# Patient Record
Sex: FEMALE | Race: WHITE | NOT HISPANIC OR LATINO | Employment: OTHER
[De-identification: names, ages, dates, MRNs, and addresses within clinical notes are randomized per-mention and may not be internally consistent; named-entity substitution may affect disease eponyms.]

---

## 2017-06-03 ENCOUNTER — HEALTH MAINTENANCE LETTER (OUTPATIENT)
Age: 47
End: 2017-06-03

## 2019-11-06 ENCOUNTER — TRANSFERRED RECORDS (OUTPATIENT)
Dept: HEALTH INFORMATION MANAGEMENT | Facility: CLINIC | Age: 49
End: 2019-11-06

## 2019-11-06 LAB
C TRACH DNA SPEC QL PROBE+SIG AMP: NEGATIVE
HPV ABSTRACT: NORMAL
N GONORRHOEA DNA SPEC QL PROBE+SIG AMP: NEGATIVE
PAP-ABSTRACT: NORMAL
SPECIMEN DESCRIP: NORMAL
SPECIMEN DESCRIPTION: NORMAL

## 2019-11-26 ENCOUNTER — TRANSFERRED RECORDS (OUTPATIENT)
Dept: HEALTH INFORMATION MANAGEMENT | Facility: CLINIC | Age: 49
End: 2019-11-26

## 2020-01-02 ENCOUNTER — TRANSFERRED RECORDS (OUTPATIENT)
Dept: HEALTH INFORMATION MANAGEMENT | Facility: CLINIC | Age: 50
End: 2020-01-02

## 2020-01-06 ENCOUNTER — HOSPITAL PATHOLOGY (OUTPATIENT)
Dept: OTHER | Facility: CLINIC | Age: 50
End: 2020-01-06

## 2020-01-06 ENCOUNTER — TRANSFERRED RECORDS (OUTPATIENT)
Dept: HEALTH INFORMATION MANAGEMENT | Facility: CLINIC | Age: 50
End: 2020-01-06

## 2020-01-08 LAB — COPATH REPORT: NORMAL

## 2020-01-24 ENCOUNTER — HOSPITAL ENCOUNTER (OUTPATIENT)
Facility: CLINIC | Age: 50
Discharge: STILL ADMITTED OR ADMINISTRATIVE | End: 2020-01-24
Attending: OBSTETRICS & GYNECOLOGY | Admitting: OBSTETRICS & GYNECOLOGY
Payer: COMMERCIAL

## 2020-01-24 ENCOUNTER — ANESTHESIA (OUTPATIENT)
Dept: SURGERY | Facility: CLINIC | Age: 50
End: 2020-01-24
Payer: COMMERCIAL

## 2020-01-24 ENCOUNTER — ANESTHESIA EVENT (OUTPATIENT)
Dept: SURGERY | Facility: CLINIC | Age: 50
End: 2020-01-24
Payer: COMMERCIAL

## 2020-01-24 ENCOUNTER — HOSPITAL ENCOUNTER (OUTPATIENT)
Facility: CLINIC | Age: 50
Setting detail: OBSERVATION
Discharge: STILL ADMITTED OR ADMINISTRATIVE | End: 2020-01-25
Attending: OBSTETRICS & GYNECOLOGY | Admitting: OBSTETRICS & GYNECOLOGY
Payer: COMMERCIAL

## 2020-01-24 VITALS
OXYGEN SATURATION: 100 % | DIASTOLIC BLOOD PRESSURE: 89 MMHG | SYSTOLIC BLOOD PRESSURE: 146 MMHG | HEART RATE: 84 BPM | RESPIRATION RATE: 16 BRPM | WEIGHT: 150 LBS | HEIGHT: 59 IN | TEMPERATURE: 97.8 F | BODY MASS INDEX: 30.24 KG/M2

## 2020-01-24 DIAGNOSIS — N99.820 POSTOPERATIVE VAGINAL BLEEDING FOLLOWING GENITOURINARY PROCEDURE: Primary | ICD-10-CM

## 2020-01-24 PROBLEM — G89.18 POST-OPERATIVE PAIN: Status: ACTIVE | Noted: 2020-01-24

## 2020-01-24 LAB
ABO + RH BLD: NORMAL
ABO + RH BLD: NORMAL
BLD GP AB SCN SERPL QL: NORMAL
BLOOD BANK CMNT PATIENT-IMP: NORMAL
CREAT SERPL-MCNC: 0.8 MG/DL (ref 0.52–1.04)
GFR SERPL CREATININE-BSD FRML MDRD: 84 ML/MIN/{1.73_M2}
HGB BLD-MCNC: 12.8 G/DL (ref 11.7–15.7)
HGB BLD-MCNC: 15.3 G/DL (ref 11.7–15.7)
SPECIMEN EXP DATE BLD: NORMAL

## 2020-01-24 PROCEDURE — 71000013 ZZH RECOVERY PHASE 1 LEVEL 1 EA ADDTL HR: Performed by: OBSTETRICS & GYNECOLOGY

## 2020-01-24 PROCEDURE — 36000050 ZZH SURGERY LEVEL 2 1ST 30 MIN: Performed by: OBSTETRICS & GYNECOLOGY

## 2020-01-24 PROCEDURE — 25800030 ZZH RX IP 258 OP 636: Performed by: ANESTHESIOLOGY

## 2020-01-24 PROCEDURE — 40000169 ZZH STATISTIC PRE-PROCEDURE ASSESSMENT I: Performed by: OBSTETRICS & GYNECOLOGY

## 2020-01-24 PROCEDURE — 36000052 ZZH SURGERY LEVEL 2 EA 15 ADDTL MIN: Performed by: OBSTETRICS & GYNECOLOGY

## 2020-01-24 PROCEDURE — 36415 COLL VENOUS BLD VENIPUNCTURE: CPT | Performed by: OBSTETRICS & GYNECOLOGY

## 2020-01-24 PROCEDURE — 85018 HEMOGLOBIN: CPT | Performed by: OBSTETRICS & GYNECOLOGY

## 2020-01-24 PROCEDURE — 86901 BLOOD TYPING SEROLOGIC RH(D): CPT | Performed by: OBSTETRICS & GYNECOLOGY

## 2020-01-24 PROCEDURE — G0378 HOSPITAL OBSERVATION PER HR: HCPCS

## 2020-01-24 PROCEDURE — 82565 ASSAY OF CREATININE: CPT | Performed by: OBSTETRICS & GYNECOLOGY

## 2020-01-24 PROCEDURE — 71000012 ZZH RECOVERY PHASE 1 LEVEL 1 FIRST HR: Performed by: OBSTETRICS & GYNECOLOGY

## 2020-01-24 PROCEDURE — 37000008 ZZH ANESTHESIA TECHNICAL FEE, 1ST 30 MIN: Performed by: OBSTETRICS & GYNECOLOGY

## 2020-01-24 PROCEDURE — 25800030 ZZH RX IP 258 OP 636: Performed by: NURSE ANESTHETIST, CERTIFIED REGISTERED

## 2020-01-24 PROCEDURE — 86900 BLOOD TYPING SEROLOGIC ABO: CPT | Performed by: OBSTETRICS & GYNECOLOGY

## 2020-01-24 PROCEDURE — 25000128 H RX IP 250 OP 636: Performed by: ANESTHESIOLOGY

## 2020-01-24 PROCEDURE — 25000128 H RX IP 250 OP 636: Performed by: OBSTETRICS & GYNECOLOGY

## 2020-01-24 PROCEDURE — 27210794 ZZH OR GENERAL SUPPLY STERILE: Performed by: OBSTETRICS & GYNECOLOGY

## 2020-01-24 PROCEDURE — 86850 RBC ANTIBODY SCREEN: CPT | Performed by: OBSTETRICS & GYNECOLOGY

## 2020-01-24 PROCEDURE — 25000125 ZZHC RX 250: Performed by: ANESTHESIOLOGY

## 2020-01-24 PROCEDURE — 37000009 ZZH ANESTHESIA TECHNICAL FEE, EACH ADDTL 15 MIN: Performed by: OBSTETRICS & GYNECOLOGY

## 2020-01-24 PROCEDURE — 25000125 ZZHC RX 250: Performed by: NURSE ANESTHETIST, CERTIFIED REGISTERED

## 2020-01-24 PROCEDURE — 25000128 H RX IP 250 OP 636: Performed by: NURSE ANESTHETIST, CERTIFIED REGISTERED

## 2020-01-24 RX ORDER — ONDANSETRON 2 MG/ML
4 INJECTION INTRAMUSCULAR; INTRAVENOUS EVERY 6 HOURS PRN
Status: DISCONTINUED | OUTPATIENT
Start: 2020-01-24 | End: 2020-01-25 | Stop reason: HOSPADM

## 2020-01-24 RX ORDER — CEFAZOLIN SODIUM 2 G/100ML
2 INJECTION, SOLUTION INTRAVENOUS
Status: DISCONTINUED | OUTPATIENT
Start: 2020-01-24 | End: 2020-01-24 | Stop reason: HOSPADM

## 2020-01-24 RX ORDER — SCOLOPAMINE TRANSDERMAL SYSTEM 1 MG/1
1 PATCH, EXTENDED RELEASE TRANSDERMAL ONCE
Status: DISCONTINUED | OUTPATIENT
Start: 2020-01-24 | End: 2020-01-24 | Stop reason: HOSPADM

## 2020-01-24 RX ORDER — CEFAZOLIN SODIUM 1 G/3ML
1 INJECTION, POWDER, FOR SOLUTION INTRAMUSCULAR; INTRAVENOUS SEE ADMIN INSTRUCTIONS
Status: DISCONTINUED | OUTPATIENT
Start: 2020-01-24 | End: 2020-01-24 | Stop reason: HOSPADM

## 2020-01-24 RX ORDER — NALOXONE HYDROCHLORIDE 0.4 MG/ML
.1-.4 INJECTION, SOLUTION INTRAMUSCULAR; INTRAVENOUS; SUBCUTANEOUS
Status: DISCONTINUED | OUTPATIENT
Start: 2020-01-24 | End: 2020-01-25 | Stop reason: HOSPADM

## 2020-01-24 RX ORDER — SODIUM CHLORIDE, SODIUM LACTATE, POTASSIUM CHLORIDE, CALCIUM CHLORIDE 600; 310; 30; 20 MG/100ML; MG/100ML; MG/100ML; MG/100ML
INJECTION, SOLUTION INTRAVENOUS CONTINUOUS
Status: DISCONTINUED | OUTPATIENT
Start: 2020-01-24 | End: 2020-01-24 | Stop reason: HOSPADM

## 2020-01-24 RX ORDER — HYDROMORPHONE HYDROCHLORIDE 1 MG/ML
.3-.5 INJECTION, SOLUTION INTRAMUSCULAR; INTRAVENOUS; SUBCUTANEOUS EVERY 5 MIN PRN
Status: DISCONTINUED | OUTPATIENT
Start: 2020-01-24 | End: 2020-01-24 | Stop reason: HOSPADM

## 2020-01-24 RX ORDER — FENTANYL CITRATE 50 UG/ML
50 INJECTION, SOLUTION INTRAMUSCULAR; INTRAVENOUS EVERY 5 MIN PRN
Status: DISCONTINUED | OUTPATIENT
Start: 2020-01-24 | End: 2020-01-24 | Stop reason: HOSPADM

## 2020-01-24 RX ORDER — ONDANSETRON 4 MG/1
4 TABLET, ORALLY DISINTEGRATING ORAL EVERY 30 MIN PRN
Status: CANCELLED | OUTPATIENT
Start: 2020-01-24

## 2020-01-24 RX ORDER — NALOXONE HYDROCHLORIDE 0.4 MG/ML
.1-.4 INJECTION, SOLUTION INTRAMUSCULAR; INTRAVENOUS; SUBCUTANEOUS
Status: CANCELLED | OUTPATIENT
Start: 2020-01-24 | End: 2020-01-25

## 2020-01-24 RX ORDER — FENTANYL CITRATE 50 UG/ML
25-50 INJECTION, SOLUTION INTRAMUSCULAR; INTRAVENOUS
Status: DISCONTINUED | OUTPATIENT
Start: 2020-01-24 | End: 2020-01-24 | Stop reason: HOSPADM

## 2020-01-24 RX ORDER — PROPOFOL 10 MG/ML
INJECTION, EMULSION INTRAVENOUS CONTINUOUS PRN
Status: DISCONTINUED | OUTPATIENT
Start: 2020-01-24 | End: 2020-01-24

## 2020-01-24 RX ORDER — HYDROMORPHONE HYDROCHLORIDE 1 MG/ML
.3-.5 INJECTION, SOLUTION INTRAMUSCULAR; INTRAVENOUS; SUBCUTANEOUS EVERY 5 MIN PRN
Status: CANCELLED | OUTPATIENT
Start: 2020-01-24

## 2020-01-24 RX ORDER — ONDANSETRON 4 MG/1
4 TABLET, ORALLY DISINTEGRATING ORAL EVERY 30 MIN PRN
Status: DISCONTINUED | OUTPATIENT
Start: 2020-01-24 | End: 2020-01-24 | Stop reason: HOSPADM

## 2020-01-24 RX ORDER — FENTANYL CITRATE 50 UG/ML
25-50 INJECTION, SOLUTION INTRAMUSCULAR; INTRAVENOUS
Status: CANCELLED | OUTPATIENT
Start: 2020-01-24

## 2020-01-24 RX ORDER — CEFAZOLIN SODIUM 1 G/3ML
1 INJECTION, POWDER, FOR SOLUTION INTRAMUSCULAR; INTRAVENOUS SEE ADMIN INSTRUCTIONS
Status: CANCELLED | OUTPATIENT
Start: 2020-01-24

## 2020-01-24 RX ORDER — CEFAZOLIN SODIUM 1 G/3ML
INJECTION, POWDER, FOR SOLUTION INTRAMUSCULAR; INTRAVENOUS PRN
Status: DISCONTINUED | OUTPATIENT
Start: 2020-01-24 | End: 2020-01-24

## 2020-01-24 RX ORDER — ONDANSETRON 2 MG/ML
4 INJECTION INTRAMUSCULAR; INTRAVENOUS EVERY 30 MIN PRN
Status: DISCONTINUED | OUTPATIENT
Start: 2020-01-24 | End: 2020-01-24 | Stop reason: HOSPADM

## 2020-01-24 RX ORDER — LIDOCAINE HYDROCHLORIDE 20 MG/ML
INJECTION, SOLUTION INFILTRATION; PERINEURAL PRN
Status: DISCONTINUED | OUTPATIENT
Start: 2020-01-24 | End: 2020-01-24

## 2020-01-24 RX ORDER — HYDROMORPHONE HYDROCHLORIDE 1 MG/ML
0.2 INJECTION, SOLUTION INTRAMUSCULAR; INTRAVENOUS; SUBCUTANEOUS
Status: DISCONTINUED | OUTPATIENT
Start: 2020-01-24 | End: 2020-01-25 | Stop reason: HOSPADM

## 2020-01-24 RX ORDER — SODIUM CHLORIDE, SODIUM LACTATE, POTASSIUM CHLORIDE, CALCIUM CHLORIDE 600; 310; 30; 20 MG/100ML; MG/100ML; MG/100ML; MG/100ML
INJECTION, SOLUTION INTRAVENOUS CONTINUOUS
Status: CANCELLED | OUTPATIENT
Start: 2020-01-24

## 2020-01-24 RX ORDER — ONDANSETRON 2 MG/ML
4 INJECTION INTRAMUSCULAR; INTRAVENOUS EVERY 30 MIN PRN
Status: CANCELLED | OUTPATIENT
Start: 2020-01-24

## 2020-01-24 RX ORDER — PROPOFOL 10 MG/ML
INJECTION, EMULSION INTRAVENOUS PRN
Status: DISCONTINUED | OUTPATIENT
Start: 2020-01-24 | End: 2020-01-24

## 2020-01-24 RX ORDER — CEFAZOLIN SODIUM 2 G/100ML
2 INJECTION, SOLUTION INTRAVENOUS
Status: CANCELLED | OUTPATIENT
Start: 2020-01-24

## 2020-01-24 RX ORDER — KETOROLAC TROMETHAMINE 30 MG/ML
30 INJECTION, SOLUTION INTRAMUSCULAR; INTRAVENOUS EVERY 6 HOURS PRN
Status: DISCONTINUED | OUTPATIENT
Start: 2020-01-24 | End: 2020-01-25 | Stop reason: HOSPADM

## 2020-01-24 RX ORDER — IBUPROFEN 200 MG
600 TABLET ORAL 2 TIMES DAILY PRN
COMMUNITY
End: 2024-02-16

## 2020-01-24 RX ORDER — ONDANSETRON 2 MG/ML
INJECTION INTRAMUSCULAR; INTRAVENOUS PRN
Status: DISCONTINUED | OUTPATIENT
Start: 2020-01-24 | End: 2020-01-24

## 2020-01-24 RX ORDER — FENTANYL CITRATE 50 UG/ML
INJECTION, SOLUTION INTRAMUSCULAR; INTRAVENOUS PRN
Status: DISCONTINUED | OUTPATIENT
Start: 2020-01-24 | End: 2020-01-24

## 2020-01-24 RX ORDER — SODIUM CHLORIDE, SODIUM LACTATE, POTASSIUM CHLORIDE, CALCIUM CHLORIDE 600; 310; 30; 20 MG/100ML; MG/100ML; MG/100ML; MG/100ML
INJECTION, SOLUTION INTRAVENOUS CONTINUOUS PRN
Status: DISCONTINUED | OUTPATIENT
Start: 2020-01-24 | End: 2020-01-24

## 2020-01-24 RX ORDER — DEXAMETHASONE SODIUM PHOSPHATE 4 MG/ML
INJECTION, SOLUTION INTRA-ARTICULAR; INTRALESIONAL; INTRAMUSCULAR; INTRAVENOUS; SOFT TISSUE PRN
Status: DISCONTINUED | OUTPATIENT
Start: 2020-01-24 | End: 2020-01-24

## 2020-01-24 RX ADMIN — FENTANYL CITRATE 50 MCG: 50 INJECTION, SOLUTION INTRAMUSCULAR; INTRAVENOUS at 15:21

## 2020-01-24 RX ADMIN — LIDOCAINE HYDROCHLORIDE 100 MG: 20 INJECTION, SOLUTION INFILTRATION; PERINEURAL at 14:04

## 2020-01-24 RX ADMIN — HYDROMORPHONE HYDROCHLORIDE 0.5 MG: 1 INJECTION, SOLUTION INTRAMUSCULAR; INTRAVENOUS; SUBCUTANEOUS at 16:01

## 2020-01-24 RX ADMIN — SODIUM CHLORIDE, POTASSIUM CHLORIDE, SODIUM LACTATE AND CALCIUM CHLORIDE: 600; 310; 30; 20 INJECTION, SOLUTION INTRAVENOUS at 15:55

## 2020-01-24 RX ADMIN — CEFAZOLIN 2 G: 1 INJECTION, POWDER, FOR SOLUTION INTRAMUSCULAR; INTRAVENOUS at 14:10

## 2020-01-24 RX ADMIN — HYDROMORPHONE HYDROCHLORIDE 0.2 MG: 1 INJECTION, SOLUTION INTRAMUSCULAR; INTRAVENOUS; SUBCUTANEOUS at 20:49

## 2020-01-24 RX ADMIN — SODIUM CHLORIDE, POTASSIUM CHLORIDE, SODIUM LACTATE AND CALCIUM CHLORIDE: 600; 310; 30; 20 INJECTION, SOLUTION INTRAVENOUS at 13:32

## 2020-01-24 RX ADMIN — SUCCINYLCHOLINE CHLORIDE 100 MG: 20 INJECTION, SOLUTION INTRAMUSCULAR; INTRAVENOUS; PARENTERAL at 14:05

## 2020-01-24 RX ADMIN — SODIUM CHLORIDE, POTASSIUM CHLORIDE, SODIUM LACTATE AND CALCIUM CHLORIDE: 600; 310; 30; 20 INJECTION, SOLUTION INTRAVENOUS at 13:58

## 2020-01-24 RX ADMIN — ONDANSETRON 4 MG: 2 INJECTION INTRAMUSCULAR; INTRAVENOUS at 14:00

## 2020-01-24 RX ADMIN — MIDAZOLAM 2 MG: 1 INJECTION INTRAMUSCULAR; INTRAVENOUS at 14:00

## 2020-01-24 RX ADMIN — DEXAMETHASONE SODIUM PHOSPHATE 4 MG: 4 INJECTION, SOLUTION INTRA-ARTICULAR; INTRALESIONAL; INTRAMUSCULAR; INTRAVENOUS; SOFT TISSUE at 14:04

## 2020-01-24 RX ADMIN — FENTANYL CITRATE 100 MCG: 50 INJECTION, SOLUTION INTRAMUSCULAR; INTRAVENOUS at 14:04

## 2020-01-24 RX ADMIN — FENTANYL CITRATE 50 MCG: 50 INJECTION, SOLUTION INTRAMUSCULAR; INTRAVENOUS at 15:04

## 2020-01-24 RX ADMIN — SCOLOPAMINE TRANSDERMAL SYSTEM 1 PATCH: 1 PATCH, EXTENDED RELEASE TRANSDERMAL at 13:39

## 2020-01-24 RX ADMIN — PROPOFOL 175 MCG/KG/MIN: 10 INJECTION, EMULSION INTRAVENOUS at 14:04

## 2020-01-24 RX ADMIN — PROPOFOL 200 MG: 10 INJECTION, EMULSION INTRAVENOUS at 14:04

## 2020-01-24 ASSESSMENT — MIFFLIN-ST. JEOR
SCORE: 1201.03
SCORE: 1201.03

## 2020-01-24 ASSESSMENT — LIFESTYLE VARIABLES
TOBACCO_USE: 0
TOBACCO_USE: 0

## 2020-01-24 NOTE — ANESTHESIA CARE TRANSFER NOTE
Patient: Radha Bonilla    Procedure(s):  EXAM UNDER ANESTHESIA  REPAIR OF VAGINAL CUFF    Diagnosis: Vaginal bleeding [N93.9]  Diagnosis Additional Information: No value filed.    Anesthesia Type:   No value filed.     Note:  Airway :Face Mask  Patient transferred to:PACU  Handoff Report: Identifed the Patient, Identified the Reponsible Provider, Reviewed the pertinent medical history, Discussed the surgical course, Reviewed Intra-OP anesthesia mangement and issues during anesthesia, Set expectations for post-procedure period and Allowed opportunity for questions and acknowledgement of understanding      Vitals: (Last set prior to Anesthesia Care Transfer)    CRNA VITALS  1/24/2020 1423 - 1/24/2020 1459      1/24/2020             Pulse:  101    SpO2:  98 %    Resp Rate (set):  10                Electronically Signed By: JOEL Wilson CRNA  January 24, 2020  2:59 PM

## 2020-01-24 NOTE — H&P
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 Attempt at controlling bleeding with Monsel's solution and vaginal packing made. Due to ongoing active bleeding, plans was made for exam under anesthesia.    PMH Ill None   Surg LSH, trachelectomy/apical colpopexy/enterocele repair   Meds None   All NKDA    SHx  Non smoker     PE Well appearing   AFVSS   Abdomen soft   Cardio RRR   Resp CTA   Bright red bleeding from vaginal cuff    Plan exam under anesthesia with suture repair of vaginal cuff defect

## 2020-01-24 NOTE — BRIEF OP NOTE
[facility] Brief Operative Note    Pre-operative diagnosis: Vaginal bleeding [N93.9]   Post-operative diagnosis Vaginal cuff dehiscence after recent trachelectomy   Procedure: Procedure(s):  EXAM UNDER ANESTHESIA  REPAIR OF VAGINAL CUFF   Surgeon(s): Surgeon(s) and Role:     * Haritha Metz MD - Primary   Estimated blood loss: * No values recorded between 1/24/2020  2:16 PM and 1/24/2020  2:49 PM *    Specimens: * No specimens in log *   Findings: Vaginal cuff mucosal dehiscence

## 2020-01-24 NOTE — ANESTHESIA PREPROCEDURE EVALUATION
Anesthesia Pre-Procedure Evaluation    Patient: Rubina Bonilla   MRN: [id number] : 1970          Preoperative Diagnosis: Vaginal bleeding [N93.9]    Procedure(s):  EXAM UNDER ANESTHESIA FOR VAGINAL BLEEDING.    No past medical history on file.  No past surgical history on file.    Anesthesia Evaluation     .             ROS/MED HX    ENT/Pulmonary:      (-) tobacco use and sleep apnea   Neurologic:  - neg neurologic ROS     Cardiovascular:  - neg cardiovascular ROS       METS/Exercise Tolerance:     Hematologic: Comments: Vaginal bleeding s/p hysterectomy        Musculoskeletal:         GI/Hepatic:        (-) GERD   Renal/Genitourinary:  - ROS Renal section negative       Endo:  - neg endo ROS       Psychiatric: Comment: 1-2 beers per day        Infectious Disease:         Malignancy:         Other:                          Physical Exam  Normal systems: cardiovascular, pulmonary and dental    Airway   Mallampati: II  TM distance: >3 FB  Neck ROM: full    Dental     Cardiovascular       Pulmonary             No results found for: WBC, HGB, HCT, PLT, CRP, SED, NA, POTASSIUM, CHLORIDE, CO2, BUN, CR, GLC, JHONNY, PHOS, MAG, ALBUMIN, PROTTOTAL, ALT, AST, GGT, ALKPHOS, BILITOTAL, BILIDIRECT, LIPASE, AMYLASE, DAVID, PTT, INR, FIBR, TSH, T4, T3, HCG, HCGS, CKTOTAL, CKMB, TROPN    Preop Vitals  BP Readings from Last 3 Encounters:   No data found for BP    Pulse Readings from Last 3 Encounters:   No data found for Pulse      Resp Readings from Last 3 Encounters:   No data found for Resp    SpO2 Readings from Last 3 Encounters:   No data found for SpO2      Temp Readings from Last 1 Encounters:   No data found for Temp    Ht Readings from Last 1 Encounters:   No data found for Ht      Wt Readings from Last 1 Encounters:   No data found for Wt    There is no height or weight on file to calculate BMI.       Anesthesia Plan      History & Physical Review  History and physical reviewed and following examination; no  interval change.    ASA Status:  2 emergent.    NPO Status:  Waived due to emergency    Plan for General and RSI with Intravenous and Propofol induction. Maintenance will be TIVA.    PONV prophylaxis:  Ondansetron (or other 5HT-3), Dexamethasone or Solumedrol and Scopolamine patch       Postoperative Care  Postoperative pain management:  IV analgesics and Oral pain medications.      Consents  Anesthetic plan, risks, benefits and alternatives discussed with:  Patient..                 Steven Mitchell MD

## 2020-01-25 VITALS
WEIGHT: 150 LBS | DIASTOLIC BLOOD PRESSURE: 58 MMHG | HEIGHT: 59 IN | HEART RATE: 73 BPM | BODY MASS INDEX: 30.24 KG/M2 | SYSTOLIC BLOOD PRESSURE: 103 MMHG | OXYGEN SATURATION: 92 % | TEMPERATURE: 98 F | RESPIRATION RATE: 16 BRPM

## 2020-01-25 LAB — HGB BLD-MCNC: 12.4 G/DL (ref 11.7–15.7)

## 2020-01-25 PROCEDURE — G0378 HOSPITAL OBSERVATION PER HR: HCPCS

## 2020-01-25 PROCEDURE — 85018 HEMOGLOBIN: CPT | Performed by: OBSTETRICS & GYNECOLOGY

## 2020-01-25 PROCEDURE — 25000128 H RX IP 250 OP 636: Performed by: OBSTETRICS & GYNECOLOGY

## 2020-01-25 PROCEDURE — 36415 COLL VENOUS BLD VENIPUNCTURE: CPT | Performed by: OBSTETRICS & GYNECOLOGY

## 2020-01-25 RX ADMIN — KETOROLAC TROMETHAMINE 30 MG: 30 INJECTION, SOLUTION INTRAMUSCULAR at 08:48

## 2020-01-25 NOTE — PROGRESS NOTES
"Patient slept well, voiding normally. Moderate cramping, treated with Toradol. No active bleeding.     /58   Pulse 73   Temp 98  F (36.7  C) (Oral)   Resp 16   Ht 1.499 m (4' 11\")   Wt 68 kg (150 lb)   LMP 09/07/2013   SpO2 92%   BMI 30.30 kg/m    Gen--NAD  CV--RRR  Abd--soft, ND, NTTP  Pelvic--Pad with minimal spotting. Vaginal packing removed, all old blood and clot. No active bleeding with monitoring for >5 minutes.     Recent Labs   Lab 01/25/20  0848 01/24/20  1931 01/24/20  1325   HGB 12.4 12.8 15.3     A/P:  Vitals and hemoglobin very stable, no active bleeding on exam or throughout evening. Strict bedrest at home today and minimal activity through the week. Will have triage call patient with follow-up appt this week.     Tiffanie Kwon MD  "

## 2020-01-25 NOTE — OP NOTE
Procedure Date: 01/24/2020      PROCEDURE:  Exam under anesthesia, repair of vaginal cuff.      PREOPERATIVE DIAGNOSES:  Vaginal bleeding after recent trachelectomy with apical colpopexy and enterocele repair.      POSTOPERATIVE DIAGNOSES:  Evidence of separation of the vaginal cuff.      SURGEON:  Kanwal Metz MD      ANESTHESIA:  General.      ESTIMATED BLOOD LOSS:  5 mL.      FINDINGS:  Evidence of separation of the vaginal mucosa at the level of the vaginal cuff.      COMPLICATIONS:  None.      INDICATIONS:  The patient is an otherwise healthy 49-year-old who in 2013 underwent a laparoscopic supracervical hysterectomy.  Two weeks ago, she underwent a laparoscopic trachelectomy with enterocele repair and apical colpopexy.  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  Inspection in the office showed vaginal bleeding with an unidentified source.  She was treated with pressure and vaginal packing with no relief.  She was brought to the operating room for that reason.      DESCRIPTION OF PROCEDURE:  The patient was taken to the operating room where general anesthesia was induced.  She was placed in high lithotomy position and prepped and draped in the usual sterile fashion.  IV Ancef was administered.  Pneumatic compression devices were applied.  A Huertas catheter was anchored.      The vaginal cuff was visualized.  There was mucosal separation, but there was no evidence of dehiscence of intra-abdominal contents.  An Allis clamp was used to jesica the vaginal cuff.  The bulk of the bleeding appeared to be coming from the left apex.  Approximately 6 stitches of 0 Vicryl suture were placed along the cuff.  Hemostasis was confirmed with suction and sponge and inspection.  At the completion of the procedure, instruments were removed and the patient was extubated and taken to the recovery room in stable condition.         KANWAL METZ MD             D:  2020   T: 2020   MT: EMILY      Name:     ADWOA COBB   MRN:      [id number]        Account:        [id number]   :      1970           Procedure Date: 2020      Document: [id number]

## 2020-01-25 NOTE — PHARMACY-ADMISSION MEDICATION HISTORY
"Pharmacy Medication History  Admission medication history interview status for the 1/24/2020  admission is complete. See EPIC admission navigator for prior to admission medications     Medication history sources: Patient  Medication history source reliability: Good  Adherence assessment: Good    Significant changes made to the medication list:  Added all OTC meds      Additional medication history information:   none    Medication reconciliation completed by provider prior to medication history? No    Time spent in this activity: 10\"      Prior to Admission medications    Medication Sig Last Dose Taking? Auth Provider   ibuprofen (ADVIL/MOTRIN) 200 MG tablet Take 600 mg by mouth 2 times daily as needed for mild pain 1/24/2020 at 0830 Yes Unknown, Entered By History   MAGNESIUM PO Take 1 tablet by mouth every morning strength unknown Past Week at Unknown time Yes Unknown, Entered By History   Omega-3 Fatty Acids (FISH OIL OMEGA-3 PO) Take 1 capsule by mouth every morning strength unknown 1/24/2020 at am Yes Unknown, Entered By History       "

## 2020-01-25 NOTE — ANESTHESIA PREPROCEDURE EVALUATION
"Anesthesia Pre-Procedure Evaluation    Patient: Radha Bonilla   MRN: [id number] : 1970          Preoperative Diagnosis: Vaginal bleeding [N93.9]    Procedure(s):  EXAM UNDER ANESTHESIA  REPAIR OF VAGINAL CUFF    Past Medical History:   Diagnosis Date     Gastro-oesophageal reflux disease      Menorrhagia      Past Surgical History:   Procedure Laterality Date     DILATION AND CURETTAGE       LAPAROSCOPIC HYSTERECTOMY SUPRACERVICAL  2013    Procedure: LAPAROSCOPIC HYSTERECTOMY SUPRACERVICAL;  LAPAROSCOPIC SUPRACERVICAL HYSTERECTOMY,BILATERAL SALPINGECTOMY;  Surgeon: Olivier Plasencia MD;  Location:  OR       Anesthesia Evaluation     .             ROS/MED HX    ENT/Pulmonary:      (-) tobacco use and sleep apnea   Neurologic:       Cardiovascular:         METS/Exercise Tolerance:     Hematologic: Comments: Vaginal bleeding s/p hysterectomy        Musculoskeletal:         GI/Hepatic:     (+) GERD Asymptomatic on medication,       Renal/Genitourinary:         Endo:         Psychiatric: Comment: 1-2 beers per day        Infectious Disease:         Malignancy:         Other:                          Physical Exam  Normal systems: cardiovascular, pulmonary and dental    Airway   Mallampati: I  TM distance: >3 FB  Neck ROM: full    Dental     Cardiovascular       Pulmonary             Lab Results   Component Value Date    HGB 15.3 2020    TSH 1.30 2014    HCGS Negative 2013       Preop Vitals  BP Readings from Last 3 Encounters:   20 116/68   14 108/66   14 104/70    Pulse Readings from Last 3 Encounters:   20 80   14 88   14 72      Resp Readings from Last 3 Encounters:   20 11   14 20   13 16    SpO2 Readings from Last 3 Encounters:   20 97%   14 98%   14 96%      Temp Readings from Last 1 Encounters:   20 37.4  C (99.3  F) (Temporal)    Ht Readings from Last 1 Encounters:   14 1.499 m (4' 11\") " "     Wt Readings from Last 1 Encounters:   12/04/14 72.1 kg (159 lb)    Estimated body mass index is 32.11 kg/m  as calculated from the following:    Height as of 7/8/14: 1.499 m (4' 11\").    Weight as of 12/4/14: 72.1 kg (159 lb).       Anesthesia Plan      History & Physical Review  History and physical reviewed and following examination; no interval change.    ASA Status:  2 emergent.    NPO Status:  Waived due to emergency    Plan for General, ETT and RSI with Intravenous and Propofol induction. Maintenance will be Inhalation.    PONV prophylaxis:  Ondansetron (or other 5HT-3) and Dexamethasone or Solumedrol       Postoperative Care  Postoperative pain management:  IV analgesics and Oral pain medications.      Consents  Anesthetic plan, risks, benefits and alternatives discussed with:  Patient..                 Steven Mitchell MD  "

## 2020-01-25 NOTE — PLAN OF CARE
Minimal vaginal drainage overnight. Medicated x1 on evening shift with IVP Dilaudid. Otherwise just c/o pressure from vaginal packing. No further pain meds given. Up to BS with assist of 1 x2. Hoping for discharge after MD see her this am.

## 2020-01-25 NOTE — PLAN OF CARE
Pt arrived via cart, VSS, afebrile. Pt states pain at a 4 on 0/10 pain scale, describes more pressure in the vaginal area than pain. Pt used bedpan with 500 ml of juancarlos red blood/urine present. Packing and half of pad bloody. Packing in place, new pad applied. Pt NPO at this time. Hgb recheck at 1930,  Dr. Peck will be on call for patient needs this weekend. Will continue to monitor. Family at bedside.

## 2020-01-25 NOTE — DISCHARGE INSTRUCTIONS
Discharge Instructions following Vaginal or Abdominal Hysterectomy  [facility]    Please return to the clinic in:       1 weeks;         2 weeks;          4weeks   Office will to call you Monday to make this appointment.  Diet:    You may feel less hungry at first.  Try to eat a well balanced diet with lots of proteins, fruits, vegetables, and whole grains.  Avoid spicy and greasy foods.    Drink plenty of fluids - at least 8 tall glasses a day.  Water is best.  Try to limit caffeine (found in coffee, tea, and cola drinks).  This helps prevent constipation (hard stools that are difficult to pass).    If constipation is a problem, consider one of these medicines: docusate (Colace), docusate with casanthranol (Yaquelin-Colace), psyllium (Metamucil) or milk of magnesia.  You can buy these at the drug store.  Follow the instructions listed on the label.  Activity:    You will need plenty of rest.  Please keep activity at a minimum for at least 1 week.  It is ok to climb stairs, but use the handrail in case you get dizzy.    Do not drive while using strong pain medications (narcotics).  After that, do not drive until you can stomp on the brakes without pain or flinching.      Do not use tampons, douche, or have sex (intercourse) until you see your doctor.    Don t lift or push anything over 15 lbs until your doctor says it s safe.  Avoid heavy or strenuous exercise.    Listen to your body.  If you feel tired or have backaches, you may be doing too much too quickly.  Pain control:    You pain should improve over the next 2-3 weeks.  If you have increased pain, please call the clinic.  It is normal to see a little sore after mild exercise.    Always take your pain medicine as directed by your doctor.  Drink a full glass of water with each dose.  Bathing    Use care to avoid slips and falls.  Gently pat your incisions dry after bathing.    After abdominal hysterectomy (through the belly):  you may shower.  Avoid tub baths and swimming for two weeks, or until your cuts heal.    After vaginal surgery you may bathe or shower as usual.  If you had surgery to repair the vaginal wall, it may help to soak in a warm bath for 20 minutes twice daily.  This will speed healing and reduce tenderness.  Normal Symptoms:    You may notice a small amount of bleeding or fluid coming from your vagina.  This could last for several weeks.  Wear pads as needed.    You may see stitches poking out of the vagina.  You may also see stitches pass through the vagina (they will look like tiny threads).  Both of these are normal.  Most stitches will dissolve within three months.    The area around your stitches may feel numb.  This should go away in less than a year.    After surgery, it is common to feel dizzy or lightheaded at times.  You might also have hot flashes, trouble sleeping, sudden mood swings and irritability.  If any of these symptoms become a problem, please call your doctor s office.  If you had a vaginal repair, you may feel tugging or pulling in the vagina.  This is a normal part of healing.    Notify your surgeon for the following signs and symptoms:    Severe chills and temperature of 100.4 oF or greater, taken under the tongue.    Bright red blood or large clots coming out of the vagina - enough to soak one pad (sanitary napkin) per hour.    Increased pain, warmth, swelling, redness at surgery site.    Foul smelling, green/yellow discharge from incision.    Urine or vaginal fluid that smells bad.    You cannot urinate (use the toilet), it burns when you urinate, or you need to go more often.    Severe nausea (feeling sick to your stomach) or vomiting (throwing up).    Increased pain that you cannot control with medicine.    Yann pain and swelling in one or both legs.    Any questions or concerns.    Same Day Surgery Discharge Instructions for  Sedation and General Anesthesia       It's not unusual to feel  dizzy, light-headed or faint for up to 24 hours after surgery or while taking pain medication.  If you have these symptoms: sit for a few minutes before standing and have someone assist you when you get up to walk or use the bathroom.      You should rest and relax for the next 24 hours. We recommend you make arrangements to have an adult stay with you for at least 24 hours after your discharge.  Avoid hazardous and strenuous activity.      DO NOT DRIVE any vehicle or operate mechanical equipment for 24 hours following the end of your surgery.  Even though you may feel normal, your reactions may be affected by the medication you have received.      Do not drink alcoholic beverages for 24 hours following surgery.       Slowly progress to your regular diet as you feel able. It's not unusual to feel nauseated and/or vomit after receiving anesthesia.  If you develop these symptoms, drink clear liquids (apple juice, ginger ale, broth, 7-up, etc. ) until you feel better.  If your nausea and vomiting persists for 24 hours, please notify your surgeon.        All narcotic pain medications, along with inactivity and anesthesia, can cause constipation. Drinking plenty of liquids and increasing fiber intake will help.      For any questions of a medical nature, call your surgeon.      Do not make important decisions for 24 hours.      If you had general anesthesia, you may have a sore throat for a couple of days related to the breathing tube used during surgery.  You may use Cepacol lozenges to help with this discomfort.  If it worsens or if you develop a fever, contact your surgeon.       If you feel your pain is not well managed with the pain medications prescribed by your surgeon, please contact your surgeon's office to let them know so they can address your concerns.             **If you have questions or concerns about your procedure,   Call Dr. Haritha Metz at [phone]**    Dr. Doyle MD is on-call this  weekend.

## 2020-01-25 NOTE — ANESTHESIA POSTPROCEDURE EVALUATION
Patient: Radha Bonilla    Procedure(s):  EXAM UNDER ANESTHESIA  REPAIR OF VAGINAL CUFF    Diagnosis:Vaginal bleeding [N93.9]  Diagnosis Additional Information: No value filed.    Anesthesia Type:  GA, ETT    Note:  Anesthesia Post Evaluation    Patient location during evaluation: PACU  Patient participation: Able to fully participate in evaluation  Level of consciousness: awake and alert  Pain management: adequate  Airway patency: patent  Cardiovascular status: acceptable  Respiratory status: acceptable  Hydration status: acceptable  PONV: none     Anesthetic complications: None          Last vitals:  Vitals:    01/24/20 1710 01/24/20 1720 01/24/20 1800   BP:   116/68   Pulse:   80   Resp: 16 12 11   Temp:      SpO2:  95% 97%         Electronically Signed By: Steven Mitchell MD  January 24, 2020  7:05 PM

## 2020-02-06 NOTE — DISCHARGE SUMMARY
Patient admitted after repair of vaginal cuff s/p trachelectomy. She was discharged home after monitoring overnight for vaginal bleeding. No further bleeding, pain stable.Follow up will be scheduled in clinic within one wek.

## 2020-06-24 ENCOUNTER — THERAPY VISIT (OUTPATIENT)
Dept: PHYSICAL THERAPY | Facility: CLINIC | Age: 50
End: 2020-06-24
Payer: COMMERCIAL

## 2020-06-24 DIAGNOSIS — M99.05 SOMATIC DYSFUNCTION OF PELVIS REGION: ICD-10-CM

## 2020-06-24 DIAGNOSIS — N81.89 PELVIC FLOOR WEAKNESS IN FEMALE: ICD-10-CM

## 2020-06-24 PROCEDURE — 97161 PT EVAL LOW COMPLEX 20 MIN: CPT | Mod: GP | Performed by: PHYSICAL THERAPIST

## 2020-06-24 PROCEDURE — 97535 SELF CARE MNGMENT TRAINING: CPT | Mod: GP | Performed by: PHYSICAL THERAPIST

## 2020-06-24 PROCEDURE — 97112 NEUROMUSCULAR REEDUCATION: CPT | Mod: GP | Performed by: PHYSICAL THERAPIST

## 2020-06-24 PROCEDURE — 97110 THERAPEUTIC EXERCISES: CPT | Mod: GP | Performed by: PHYSICAL THERAPIST

## 2020-06-24 NOTE — PROGRESS NOTES
[facility] Initial Evaluation  Subjective:  The history is provided by the patient. No  was used.   Therapist Generated HPI Evaluation  Problem details: January 6th,2020 had cervix removed.  Prior to this has had a hysterectomy.  3 weeks afterwards did a squat and stiches ripped apart.  Went into shock and had bleeding.  Had to have the surgery redone and this took 5 hours. No movement for a month, no Kegals for 6 weeks.  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 Lives in a house.  No restrictions now. Has some urgency to urinate.  Has had intermittent leaking with urge.  Not really any pain.  Mild pain with intercourse.  Can stop the flow of urine but not sometimes.  Has some left buttock pain with radiating to lower leg since this past surgery.  More constant now.  .         Type of problem:  Incontinence and pelvic dysfunction.    This is a new condition.  Condition occurred with:  After surgery.    Patient reports pain:  Vaginal.        Symptoms are exacerbated by intercourse (urge)            Patient Health History  Radha GAVIN Chad being seen for pelvic floor therapy.     Date of Onset: january 2020.   Problem occurred: ripped out sutures post surgery   Pain is reported as 1/10 on pain scale.  General health as reported by patient is excellent.   Other medical history details: headaches.   Red flags:  Changes in bowel and bladder habits and incontinence.      Other surgery history details: hysterectomy.        Current occupation is .   Primary job tasks include:  Repetitive tasks.                                    Objective:  System                                 Pelvic Dysfunction Evaluation:    Bladder/Pelvic Problems:    Storage Problem:  Urge incontinence    Dyspareunia:  Grade 1    Diagnostic Tests:    Pelvic Exam:  X                      Flexibility:  normal (except pain in the left  glut with piriformis stretch)      Abdominal Wall:  normal      Scar Mobility:  Good  Pelvic Clock Exam:  Pelvic clock exam: left.        Levator ANI:  ++    SI Provocation:  NA        Reflex Testing:  NA    External Assessment:  normal              Internal Assessment:  Internal assessment pelvic: pain over left levator and left OI.  Sensory Exam:  Normal  Contraction/Grade:  Fair squeeze, definite lift (3)          SEMG Biofeedback:    Equipment:  [employer] with computer    Suraface electrode placement--Perianal:  X  Baseline EMG PM:  Down to 2.0mv by the end        EMG interpretation to fatigue:  Less than3 seconds  Position:  SupineAdditional History:  Delivery History:  Vaginal delivery, episiotomies and tearing    Number of Live Births: 2                       General     ROS    Assessment/Plan:    Patient is a 50 year old female with pelvic complaints.    Patient has the following significant findings with corresponding treatment plan.                Diagnosis 1:  Pelvic dysfucntion  Pain -  manual therapy, self management, education and home program  Decreased ROM/flexibility - manual therapy, therapeutic exercise and home program  Decreased strength - therapeutic exercise, therapeutic activities and home program  Impaired muscle performance - biofeedback, neuro re-education and home program  Decreased function - therapeutic activities and home program    Therapy Evaluation Codes:   1) History comprised of:   Personal factors that impact the plan of care:      None.    Comorbidity factors that impact the plan of care are:      None.     Medications impacting care: None.  2) Examination of Body Systems comprised of:   Body structures and functions that impact the plan of care:      Pelvis.   Activity limitations that impact the plan of care are:      [place] and Urge incontinence.  3) Clinical presentation characteristics are:   Stable/Uncomplicated.  4) Decision-Making    Low complexity using standardized  patient assessment instrument and/or measureable assessment of functional outcome.  Cumulative Therapy Evaluation is: Low complexity.    Previous and current functional limitations:  (See Goal Flow Sheet for this information)    Short term and Long term goals: (See Goal Flow Sheet for this information)     Communication ability:  Patient appears to be able to clearly communicate and understand verbal and written communication and follow directions correctly.  Treatment Explanation - The following has been discussed with the patient:   RX ordered/plan of care  Anticipated outcomes  Possible risks and side effects  This patient would benefit from PT intervention to resume normal activities.   Rehab potential is excellent.    Frequency:  1 X a month, once daily  Duration:  for 3 months  Discharge Plan:  Achieve all LTG.  Independent in home treatment program.  Reach maximal therapeutic benefit.    Please refer to the daily flowsheet for treatment today, total treatment time and time spent performing 1:1 timed codes.

## 2020-06-24 NOTE — LETTER
[facility] ATHLETIC [facility] PHYSICAL [facility]  [address]  [facility] [address]  [phone]    2020    Re: Radha Bonilla   :   1970  MRN:  [id number]   REFERRING PHYSICIAN:   Olivier Plasencia    [facility] ATHLETIC [facility] PHYSICAL [facility]  Date of Initial Evaluation:  20  Visits:  Rxs Used: 1  Reason for Referral:  Pelvic floor weakness in female; Somatic dysfunction of pelvis region    EVALUATION SUMMARY    [facility] Initial Evaluation  Subjective:  The history is provided by the patient. No  was used.     Therapist Generated HPI Evaluation  Problem details:  had cervix removed.  Prior to this has had a hysterectomy.  3 weeks afterwards did a squat and stiches ripped apart.  Went into shock and had bleeding.  Had to have the surgery redone and this took 5 hours. No movement for a month, no Kegals for 6 weeks.  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 Lives in a house.  No restrictions now. Has some urgency to urinate.  Has had intermittent leaking with urge.  Not really any pain.  Mild pain with intercourse.  Can stop the flow of urine but not sometimes.  Has some left buttock pain with radiating to lower leg since this past surgery.  More constant now.      Type of problem:  Incontinence and pelvic dysfunction.  This is a new condition.  Condition occurred with:  After surgery.  Patient reports pain:  Vaginal.  Symptoms are exacerbated by intercourse (urge)      Patient Health History  Radha Bonilla being seen for pelvic floor therapy.   Date of Onset: 2020.   Problem occurred: ripped out sutures post surgery   Pain is reported as 1/10 on pain scale.  General health as reported by patient is excellent.  Other medical history details: headaches.   Red flags:  Changes in bowel and bladder habits and  incontinence.  Other surgery history details: hysterectomy.    Current occupation is .   Primary job tasks include:  Repetitive tasks.              Objective:  Pelvic Dysfunction Evaluation:    Bladder/Pelvic Problems:    Storage Problem:  Urge incontinence  Dyspareunia:  Grade 1    Diagnostic Tests:    Pelvic Exam:  X  Flexibility:  normal (except pain in the left glut with piriformis stretch)  Re: Radha ROMAINE Bonilla   :   1970        Abdominal Wall:  normal  Scar Mobility:  Good  Pelvic Clock Exam:  Pelvic clock exam: left.  Levator ANI:  ++  SI Provocation:  NA  Reflex Testing:  NA  External Assessment:  normal  Internal Assessment:  Internal assessment pelvic: pain over left levator and left OI.  Sensory Exam:  Normal  Contraction/Grade:  Fair squeeze, definite lift (3)  SEMG Biofeedback:    Equipment:  [employer] with computer  Suraface electrode placement--Perianal:  X  Baseline EMG PM:  Down to 2.0mv by the end  EMG interpretation to fatigue:  Less than3 seconds  Position:  SupineAdditional History:  Delivery History:  Vaginal delivery, episiotomies and tearing  Number of Live Births: 2    Assessment/Plan:    Patient is a 50 year old female with pelvic complaints.    Patient has the following significant findings with corresponding treatment plan.                Diagnosis 1:  Pelvic dysfucntion  Pain -  manual therapy, self management, education and home program  Decreased ROM/flexibility - manual therapy, therapeutic exercise and home program  Decreased strength - therapeutic exercise, therapeutic activities and home program  Impaired muscle performance - biofeedback, neuro re-education and home program  Decreased function - therapeutic activities and home program    Therapy Evaluation Codes:   1) History comprised of:   Personal factors that impact the plan of care:      None.    Comorbidity factors that impact the plan of care are:      None.     Medications impacting care:  None.  2) Examination of Body Systems comprised of:   Body structures and functions that impact the plan of care:      Pelvis.   Activity limitations that impact the plan of care are:      [place] and Urge incontinence.  3) Clinical presentation characteristics are:   Stable/Uncomplicated.  4) Decision-Making    Low complexity using standardized patient assessment instrument and/or measureable assessment of functional outcome.  Cumulative Therapy Evaluation is: Low complexity.    Previous and current functional limitations:  (See Goal Flow Sheet for this information)    Short term and Long term goals: (See Goal Flow Sheet for this information)     Communication ability:  Patient appears to be able to clearly communicate and understand verbal and written communication and follow directions correctly.    Re: Radha Bonilla   :   1970          Treatment Explanation - The following has been discussed with the patient:   RX ordered/plan of care  Anticipated outcomes  Possible risks and side effects  This patient would benefit from PT intervention to resume normal activities.   Rehab potential is excellent.    Frequency:  1 X a month, once daily  Duration:  for 3 months  Discharge Plan:  Achieve all LTG.  Independent in home treatment program.  Reach maximal therapeutic benefit.    Thank you for your referral.    INQUIRIES  Therapist: Jody Shelley, PT  [facility] PHYSICAL THERAPY  [address] [place] [address] [address] [place] [address]  Phone: [phone]  Fax: [phone]

## 2020-08-12 PROBLEM — M99.05 SOMATIC DYSFUNCTION OF PELVIS REGION: Status: RESOLVED | Noted: 2020-06-24 | Resolved: 2020-08-12

## 2020-08-12 PROBLEM — N81.89 PELVIC FLOOR WEAKNESS IN FEMALE: Status: RESOLVED | Noted: 2020-06-24 | Resolved: 2020-08-12

## 2020-09-03 ENCOUNTER — OFFICE VISIT (OUTPATIENT)
Dept: PODIATRY | Facility: CLINIC | Age: 50
End: 2020-09-03
Payer: COMMERCIAL

## 2020-09-03 VITALS
HEIGHT: 59 IN | DIASTOLIC BLOOD PRESSURE: 68 MMHG | WEIGHT: 150 LBS | BODY MASS INDEX: 30.24 KG/M2 | SYSTOLIC BLOOD PRESSURE: 124 MMHG

## 2020-09-03 DIAGNOSIS — G89.29 CHRONIC LEFT-SIDED LOW BACK PAIN WITH LEFT-SIDED SCIATICA: Primary | ICD-10-CM

## 2020-09-03 DIAGNOSIS — M72.2 PLANTAR FASCIAL FIBROMATOSIS: ICD-10-CM

## 2020-09-03 DIAGNOSIS — M54.42 CHRONIC LEFT-SIDED LOW BACK PAIN WITH LEFT-SIDED SCIATICA: Primary | ICD-10-CM

## 2020-09-03 PROCEDURE — 99204 OFFICE O/P NEW MOD 45 MIN: CPT | Performed by: PODIATRIST

## 2020-09-03 ASSESSMENT — MIFFLIN-ST. JEOR: SCORE: 1196.03

## 2020-09-03 NOTE — PROGRESS NOTES
"Foot & Ankle Surgery  September 3, 2020    CC: \"bump in arch of left foot past 14 months\"    I was asked to see Radha Bonilla regarding the chief complaint by:  self    HPI:  Pt is a 50 year old female who presents with above complaint.  Main reason is L ankle, knee and hip pain.  Thinks it may be from loss of muscle tone left side.  T-6 dislocation in back.  \"there's something going on with my glute\".  \"I thought it was sciatica\".  Also has a mass on bottom of left foot. \"thought it was a ganglion cyst\".  No injuries/puncture wounds.  accupuncturist stuck a needle in it, nothing came out.  Present x 14 months.  \"some pain w/ pressure, swollen --> radiating pain into knee ankle\".  7/10 \"daily\", worse with \"activity\".  Has done ice, rolling, rest\", has helped \"somewhat\".      ROS:   Pos for CC.  The patient denies current nausea, vomiting, chills, fevers, belly pain, calf pain, chest pain or SOB.  Complete remainder of ROS is otherwise neg.    VITALS:    Vitals:    09/03/20 1519   BP: 124/68   Weight: 68 kg (150 lb)   Height: 1.499 m (4' 11\")       PMH:    Past Medical History:   Diagnosis Date     Gastro-oesophageal reflux disease      Menorrhagia        SXHX:    Past Surgical History:   Procedure Laterality Date     DILATION AND CURETTAGE       DILATION AND CURETTAGE N/A 1/24/2020    Procedure: EXAM UNDER ANESTHESIA  REPAIR OF VAGINAL CUFF;  Surgeon: Haritha Metz MD;  Location:  OR     LAPAROSCOPIC HYSTERECTOMY SUPRACERVICAL  11/7/2013    Procedure: LAPAROSCOPIC HYSTERECTOMY SUPRACERVICAL;  LAPAROSCOPIC SUPRACERVICAL HYSTERECTOMY,BILATERAL SALPINGECTOMY;  Surgeon: Olivier Plasencia MD;  Location:  OR        MEDS:    Current Outpatient Medications   Medication     ibuprofen (ADVIL/MOTRIN) 200 MG tablet     MAGNESIUM PO     Omega-3 Fatty Acids (FISH OIL OMEGA-3 PO)     No current facility-administered medications for this visit.        ALL:     Allergies   Allergen Reactions     No Known Allergies  "       FMH:  Diabetes in grandparent    Family History   Problem Relation Age of Onset     Family History Negative Mother        SocHx:    Social History     Socioeconomic History     Marital status:      Spouse name: Not on file     Number of children: Not on file     Years of education: Not on file     Highest education level: Not on file   Occupational History     Not on file   Social Needs     Financial resource strain: Not on file     Food insecurity     Worry: Not on file     Inability: Not on file     Transportation needs     Medical: Not on file     Non-medical: Not on file   Tobacco Use     Smoking status: Never Smoker     Smokeless tobacco: Never Used   Substance and Sexual Activity     Alcohol use: Yes     Comment: SOCIALLY     Drug use: No     Sexual activity: Not on file   Lifestyle     Physical activity     Days per week: Not on file     Minutes per session: Not on file     Stress: Not on file   Relationships     Social connections     Talks on phone: Not on file     Gets together: Not on file     Attends [employer] service: Not on file     Active member of club or organization: Not on file     Attends meetings of clubs or organizations: Not on file     Relationship status: Not on file     Intimate partner violence     Fear of current or ex partner: Not on file     Emotionally abused: Not on file     Physically abused: Not on file     Forced sexual activity: Not on file   Other Topics Concern     Not on file   Social History Narrative     Not on file           EXAMINATION:  Gen:   No apparent distress  Neuro:   A&Ox3, no deficits  Psych:    Answering questions appropriately for age and situation with normal affect  Head:    NCAT  Eye:    Visual scanning without deficit  Ear:    Response to auditory stimuli wnl  Lung:    Non-labored breathing on RA noted  Abd:    NTND per patient report  Lymph:    Neg for pitting/non-pitting edema BLE  Vasc:    Pulses palpable, CFT minimally delayed  Neuro:     Light touch sensation intact to all sensory nerve distributions without paresthesias  Derm:    Neg for nodules, lesions or ulcerations  MSK:    Mass consistent with fibroma along medial margin of plantar fascial central band. Ankle, hip, knee not examined.  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  Soft tissue mass consistent with plantar fibroma left arch  -comfortable shoe gear  -minimize shoeless ambulation based on symptoms  -RICE/NSAID prn based on pain  -discussed inserts, and more specifically, custom orthotics for offloading the area  -discussed surgical excision is last option    2.  Chronic left-sided low back pain with symptoms in left lower extremity   -Sports Med referral for back, hip evaluation     Follow up:  Prn for fibroma or sooner with acute issues      Patient's medical history was reviewed today    Body mass index is 30.3 kg/m .  Weight management plan: Patient was referred to their PCP to discuss a diet and exercise plan.        Yao Durant DPM FACFAS FACFAOM  Podiatric Foot & Ankle Surgeon  [facility]  [phone]

## 2020-09-03 NOTE — PATIENT INSTRUCTIONS
Thank you for choosing [facility] / Foot & Ankle Surgery!    DR. JIMENEZ'S CLINIC LOCATIONS:   [place] [id number]   [place] [address]                        [place] [address]   [phone]      SET UP SURGERY: [phone]   APPOINTMENTS: [phone]   BILLING QUESTIONS: [phone]   FAX NUMBER: [phone]     Follow up:  As needed        Please read through the following handouts and if you have any questions, please feel free to call us or send a [facility] message!      PLANTAR FIBROMA  A plantar fibroma is a fibrous knot (nodule) in the arch of the foot. It is embedded within the plantar fascia, a band of tissue that extends from the heel to the toes on the bottom of the foot. A plantar fibroma can develop in one or both feet, is benign (non-malignant), and usually will not go away or get smaller without treatment. Definitive causes for this condition have not been clearly identified.    SIGNS & SYMPTOMS  The characteristic sign of a plantar fibroma is a noticeable lump in the arch that feels firm to the touch. This mass can remain the same size or get larger over time, or additional fibromas may develop.  People who have a plantar fibroma may or may not have pain. When pain does occur, it is often caused by shoes pushing against the lump in the arch, although it can also arise when walking or standing barefoot.    DIAGNOSIS  To diagnose a plantar fibroma, the foot and ankle surgeon will examine the foot and press on the affected area. Sometimes this can produce pain that extends down to the toes. An MRI or biopsy may be performed to further evaluate the lump and aid in diagnosis.    TREATMENT OPTIONS   Non-surgical treatment may help relieve the pain of a plantar fibroma, although it will not make the mass disappear. The foot and ankle  surgeon may select one or more of the following non-surgical options:     Steroid injections. Injecting corticosteroid medication into the mass may help  shrink it and thereby relieve the pain that occurs when walking. This reduction  may be only temporary and the fibroma could slowly return to its original size.      Orthotic devices. If the fibroma is stable, meaning it is not changing in size,  custom orthotic devices (shoe inserts) may relieve the pain by distributing the  patient s weight away from the fibroma.      Physical therapy. The pain is sometimes treated through physical therapy  methods that deliver anti-inflammatory medication into the fibroma without the  need for injection.  Cross friction massage.     Verapamil Cream: Applying 10% or higher verapamil cream can help to decrease  size.     Surgery: If the mass increases in size or pain, the patient should be further  evaluated. Surgical treatment to remove the fibroma is considered if the patient  continues to experience pain following non-surgical approaches. Surgical  removal of a plantar fibroma may result in a flattening of the arch or  development of hammertoes. Orthotic devices may be prescribed to provide  support to the foot. Due to the high incidence of recurrence with this condition,  continued follow-up with the foot and ankle surgeon is recommended.          PRICE THERAPY  Many aches and pains throughout the foot and ankle can be helped with many simple treatments. This is usually described as PRICE Therapy.      P - Protection - often times, inflammation/pain in the lower extremity is not able to improve simply because the areas involved are never allowed to rest. Every step we take can bother the problematic area. Protecting those areas is an important step in the healing process. This may involve a walking cast boot, a special insert/orthotic device, an ankle brace, or simply avoiding barefoot walking.    R - Rest - in addition to  protecting the foot/ankle, resting is an important, but often times difficult, treatment option. Getting off your feet when they bother you, and specifically avoiding activities that cause pain/discomfort, are very beneficial to prevent, and treat, foot/ankle pain.      I - Ice - icing regularly can help to decrease inflammation and swelling in the foot, thus decreasing pain. Using an ice pack or a bag of frozen veggies works very well. Ice for 20 minutes multiple times per day as needed.  Do not place the ice directly on the skin as this can cause tissue damage.    C - Compression - using a compression wrap or an ACE wrap can help to decrease swelling, which can help to decrease pain. Wearing the wraps is generally not needed at night, but they should be worn on a regular basis when you are going to be on your feet for prolonged periods as gravity tends to pull fluids down to your feet/ankles.    E - Elevation - elevating your lower extremities multiple times daily for 15-20 minutes can help to decrease swelling, which works well in decreasing pain levels.    NSAID/Tylenol - Anti-inflammatories like Aleve or ibuprofen, and/or a pain medication, such as Tylenol, can help to improve pain levels and get the issue resolved sooner rather than later. Anyone with liver issues should be careful with Tylenol, and anyone with high blood pressure or heart, stomach or kidney issues should be careful with anti-inflammatories. Please ask if you have questions about these medications, including dosage.              (BMI) Body Mass Index  Many things can cause foot and ankle problems. Foot structure, activity level, foot mechanics and injuries are common causes of pain.One very important issue that often goes unmentioned, is body weight.  Extra weight can cause increased stress on muscles, ligaments, bones and tendons. Sometimes just a few extra pounds is all it takes to put one over her/his threshold. Without reducing that  stress, it can be difficult to alleviate pain. Some people are uncomfortable addressing this issue, but we feel it is important for you to think about it. As Foot & Ankle specialists, our job is addressing the lower extremity problem and possible causes. Regarding extra body weight, we encourage patients to discuss diet and weight management plans with their primary care doctors. It is this team approach that gives you the best opportunity for pain relief and getting you back on your feet.

## 2020-09-09 ENCOUNTER — HOSPITAL ENCOUNTER (OUTPATIENT)
Dept: MAMMOGRAPHY | Facility: CLINIC | Age: 50
Discharge: HOME | End: 2020-09-09
Attending: OBSTETRICS & GYNECOLOGY | Admitting: OBSTETRICS & GYNECOLOGY
Payer: COMMERCIAL

## 2020-09-09 DIAGNOSIS — Z12.31 VISIT FOR SCREENING MAMMOGRAM: ICD-10-CM

## 2020-09-09 PROCEDURE — 77063 BREAST TOMOSYNTHESIS BI: CPT

## 2020-09-15 ENCOUNTER — OFFICE VISIT (OUTPATIENT)
Dept: NEUROSURGERY | Facility: CLINIC | Age: 50
End: 2020-09-15
Attending: PHYSICIAN ASSISTANT
Payer: COMMERCIAL

## 2020-09-15 VITALS
SYSTOLIC BLOOD PRESSURE: 126 MMHG | HEART RATE: 94 BPM | WEIGHT: 159 LBS | HEIGHT: 59 IN | DIASTOLIC BLOOD PRESSURE: 87 MMHG | BODY MASS INDEX: 32.05 KG/M2 | OXYGEN SATURATION: 98 %

## 2020-09-15 DIAGNOSIS — M54.50 CHRONIC LEFT-SIDED LOW BACK PAIN WITHOUT SCIATICA: Primary | ICD-10-CM

## 2020-09-15 DIAGNOSIS — G89.29 CHRONIC LEFT-SIDED LOW BACK PAIN WITHOUT SCIATICA: Primary | ICD-10-CM

## 2020-09-15 PROCEDURE — G0463 HOSPITAL OUTPT CLINIC VISIT: HCPCS

## 2020-09-15 PROCEDURE — 99203 OFFICE O/P NEW LOW 30 MIN: CPT | Performed by: PHYSICIAN ASSISTANT

## 2020-09-15 ASSESSMENT — PAIN SCALES - GENERAL: PAINLEVEL: NO PAIN (0)

## 2020-09-15 ASSESSMENT — MIFFLIN-ST. JEOR: SCORE: 1236.85

## 2020-09-15 NOTE — LETTER
9/15/2020         RE: Radha Bonilla  [address] [address]        Dear Colleague,    Thank you for referring your patient, Radha Bonilla, to the [facility]UROS[facility]GERY [facility]. Please see a copy of my visit note below.    Neurosurgery Consult    HPI    Ms. Bonilla is a 50-year-old female referred to us for evaluation of left low back pain leg pain and foot pain.  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  No surgical intervention is being planned.  She thinks may be the symptoms in her left leg are related to the way she walks on her left foot.  She also reports some low back pain and occasional sciatic pain into her left buttock.  She has not done any physical therapy recently.    Medical history  Noncontributory    Social history  Works as a       B/P: 126/87, T: [did not read[, P: 94, R: Data Unavailable       Exam    Alert and oriented no acute distress  Bilateral lower extremities with 5/5 strength  Reflexes 2+ patella/ankle  Negative straight leg raise bilaterally  Negative ankle clonus negative Babinski bilaterally  Lumbar spine nontender to palpation  Able to stand on heels and toes  Gait is normal    Imaging    No imaging to review    Assessment    Low back pain  Left foot fibroma, plantar    Plan:      I recommend the patient meet with physical therapy for evaluation and treatment.  She should follow-up with us as needed depending on how she responds to physical therapy and if her symptoms are progressed to more radicular symptoms and possibly imaging would be warranted.    Total time of 30 minutes spent with the patient today greater than 50% spent face to face in counseling and coordination of care.    Again, thank you for allowing me to participate in the care of your patient.        Sincerely,        Diana Tiwari PA-C

## 2020-09-15 NOTE — PROGRESS NOTES
Neurosurgery Consult    HPI    Ms. Bonilla is a 50-year-old female referred to us for evaluation of left low back pain leg pain and foot pain.  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  No surgical intervention is being planned.  She thinks may be the symptoms in her left leg are related to the way she walks on her left foot.  She also reports some low back pain and occasional sciatic pain into her left buttock.  She has not done any physical therapy recently.    Medical history  Noncontributory    Social history  Works as a       B/P: 126/87, T: [did not read[, P: 94, R: Data Unavailable       Exam    Alert and oriented no acute distress  Bilateral lower extremities with 5/5 strength  Reflexes 2+ patella/ankle  Negative straight leg raise bilaterally  Negative ankle clonus negative Babinski bilaterally  Lumbar spine nontender to palpation  Able to stand on heels and toes  Gait is normal    Imaging    No imaging to review    Assessment    Low back pain  Left foot fibroma, plantar    Plan:      I recommend the patient meet with physical therapy for evaluation and treatment.  She should follow-up with us as needed depending on how she responds to physical therapy and if her symptoms are progressed to more radicular symptoms and possibly imaging would be warranted.    Total time of 30 minutes spent with the patient today greater than 50% spent face to face in counseling and coordination of care.

## 2020-09-15 NOTE — NURSING NOTE
"Radha Bonilla is a 50 year old female who presents for:  Chief Complaint   Patient presents with     Back Pain     Left sided LBP w/ sciatica        Initial Vitals:  /87   Pulse 94   Ht 4' 11\" (1.499 m)   Wt 159 lb (72.1 kg)   LMP 09/07/2013   SpO2 98%   BMI 32.11 kg/m   Estimated body mass index is 32.11 kg/m  as calculated from the following:    Height as of this encounter: 4' 11\" (1.499 m).    Weight as of this encounter: 159 lb (72.1 kg).. Body surface area is 1.73 meters squared. BP completed using cuff size: regular  No Pain (0)    Nursing Comments: Patient presents w/ LBP w left sided sciatica    Reji Santiago MA  "

## 2020-09-16 ENCOUNTER — HOSPITAL ENCOUNTER (OUTPATIENT)
Facility: CLINIC | Age: 50
End: 2020-09-16
Attending: COLON & RECTAL SURGERY | Admitting: COLON & RECTAL SURGERY
Payer: COMMERCIAL

## 2020-09-16 DIAGNOSIS — Z11.59 ENCOUNTER FOR SCREENING FOR OTHER VIRAL DISEASES: Primary | ICD-10-CM

## 2020-09-30 ENCOUNTER — THERAPY VISIT (OUTPATIENT)
Dept: PHYSICAL THERAPY | Facility: CLINIC | Age: 50
End: 2020-09-30
Payer: COMMERCIAL

## 2020-09-30 DIAGNOSIS — M72.2 PLANTAR FIBROMATOSIS: ICD-10-CM

## 2020-09-30 DIAGNOSIS — G89.29 CHRONIC LEFT-SIDED LOW BACK PAIN WITH LEFT-SIDED SCIATICA: ICD-10-CM

## 2020-09-30 DIAGNOSIS — M54.42 CHRONIC LEFT-SIDED LOW BACK PAIN WITH LEFT-SIDED SCIATICA: ICD-10-CM

## 2020-09-30 PROCEDURE — 97110 THERAPEUTIC EXERCISES: CPT | Mod: GP | Performed by: PHYSICAL THERAPIST

## 2020-09-30 PROCEDURE — 97161 PT EVAL LOW COMPLEX 20 MIN: CPT | Mod: GP | Performed by: PHYSICAL THERAPIST

## 2020-09-30 NOTE — PROGRESS NOTES
[facility] Initial Evaluation  Subjective:  Radha Bonilla is a 50 year old female.    Patient s chief complaints: L LBP.    Condition occurred due to has plantar fibromatosis and feels she is walking differently due to that developed LBP with L sided sciatic symptoms.  Date of Onset: around 7/1/2019 and ongoing, recent referral to PT on 9/15/20  Location of symptoms is medial longitudinal arch, L glute, lateral lower leg to lateral ankle .  Symptoms other than pain include: denies numbness/tingling/weakness   Quality of pain is burning and frequency is intermittent  Pain dependence on time of day is: stiff in morning otherwise doesn't matter.  .   Pain rating is: 2/10.    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    Symptoms are relieved by:  Acupuncture, sitting on a tennis ball.    Progression of symptoms is that symptoms are:  Overall improving.  Imaging/Special tests include: none   Previous treatments include: acupuncture with benefit.   Patient reports that general health is: excellent.   Pertinent medical history includes:  none.    Medical allergies includes: none.   Surgical history includes: pelvic floor reconstruction early 2020. Hysterectomy, coloscopy/repair.  Current medications include: none  Current occupation is:   Work status is: 28 classes/wk normally, 16 now due to COVID restrictions  Primary job tasks include: yoga  Barriers include: none  Red flags include: none    Patient's expectations for therapy include: improve foot and leg pain.    HPI                     Objective:    LUMBAR:    Posture: fair-good  Posture Correction: no effect  Relevant Lateral Shift: no    Neurological:    Motor Deficit:  Myotomes L R   L1-2 (hip flexion) 5 5   L3 (knee extension) 5 5   L4 (ankle DF) 5 5   L5 (g. toe ext) 5 5   S1 (ankle PF or knee flex) 5 5     Sensory Deficit, Reflexes: intact light touch  screen B LE dermatome    Dural Signs:   L R   Slump negative negative   SLR negative negative     AROM: (Major, Moderate, Minimal or Nil loss)  Movement Loss Seven Mod Min Nil Pain   Flexion    X No effect   Extension    X No effect   Side Gliding L    X No effect   Side Gliding R    X Trace pain L iliac crest area     Repeated movement testing:   (During: produces, abolishes, increases, decreases, no effect, centralizing, peripheralizing; After: better, worse, no better, no worse, no effect, centralized, peripheralized)    Pre-test Symptoms Standing: no symptoms   Symptoms During Symptoms After ROM increased ROM decreased No Effect   FIS        Rep FIS No effect No effect   X   EIS        Rep EIS No effect No effect   X   Pre-test Symptoms Lying: no symptoms   Symptoms During Symptoms After ROM increased ROM decreased No Effect   TURNER        Rep TURNER No effect No effect   X   EIL        Rep EIL No effect No effect   X     Other Tests: hip PROM and FADIR non painful    Provisional Classification: mechanically inconclusive, will trial extension in lying  Principle of Management: press up x 10 reps 4x/day    ANKLE:    PROM: DF knee extended with calf tightness, DF knee bent is with stiffness anterior ankle  Joint mobility: stiffness talar area with squatting (L)    Palpation: tender medial plantar arch, palpable plantar fibromatosis noted (L)    System    Physical Exam    General     ROS    Assessment/Plan:    Patient is a 50 year old female with lumbar and left side foot complaints.    Patient has the following significant findings with corresponding treatment plan.                Diagnosis 1:  LBP with L sciatica; L plantar fibromatosis    Pain -  hot/cold therapy, US, manual therapy and directional preference exercise  Decreased ROM/flexibility - manual therapy and therapeutic exercise  Decreased strength - therapeutic exercise and therapeutic activities  Decreased proprioception - neuro re-education and therapeutic  activities  Impaired gait - gait training  Decreased function - therapeutic activities  Impaired posture - neuro re-education    Therapy Evaluation Codes:   1) History comprised of:   Personal factors that impact the plan of care:      None.    Comorbidity factors that impact the plan of care are:      None.     Medications impacting care: None.  2) Examination of Body Systems comprised of:   Body structures and functions that impact the plan of care:      Lumbar spine and foot.   Activity limitations that impact the plan of care are:      Standing.  3) Clinical presentation characteristics are:   Stable/Uncomplicated.  4) Decision-Making    Low complexity using standardized patient assessment instrument and/or measureable assessment of functional outcome.  Cumulative Therapy Evaluation is: Low complexity.    Previous and current functional limitations:  (See Goal Flow Sheet for this information)    Short term and Long term goals: (See Goal Flow Sheet for this information)     Communication ability:  Patient appears to be able to clearly communicate and understand verbal and written communication and follow directions correctly.  Treatment Explanation - The following has been discussed with the patient:   RX ordered/plan of care  Anticipated outcomes  Possible risks and side effects  This patient would benefit from PT intervention to resume normal activities.   Rehab potential is good.    Frequency:  1 X week, once daily  Duration:  for 6 weeks  Discharge Plan:  Achieve all LTG.  Independent in home treatment program.  Reach maximal therapeutic benefit.    Please refer to the daily flowsheet for treatment today, total treatment time and time spent performing 1:1 timed codes.

## 2020-10-07 ENCOUNTER — THERAPY VISIT (OUTPATIENT)
Dept: PHYSICAL THERAPY | Facility: CLINIC | Age: 50
End: 2020-10-07
Payer: COMMERCIAL

## 2020-10-07 DIAGNOSIS — M72.2 PLANTAR FIBROMATOSIS: ICD-10-CM

## 2020-10-07 DIAGNOSIS — G89.29 CHRONIC LEFT-SIDED LOW BACK PAIN WITH LEFT-SIDED SCIATICA: Primary | ICD-10-CM

## 2020-10-07 DIAGNOSIS — M54.42 CHRONIC LEFT-SIDED LOW BACK PAIN WITH LEFT-SIDED SCIATICA: Primary | ICD-10-CM

## 2020-10-07 PROCEDURE — 97140 MANUAL THERAPY 1/> REGIONS: CPT | Mod: GP | Performed by: PHYSICAL THERAPIST

## 2020-10-07 PROCEDURE — 97110 THERAPEUTIC EXERCISES: CPT | Mod: GP | Performed by: PHYSICAL THERAPIST

## 2020-10-07 PROCEDURE — 97035 APP MDLTY 1+ULTRASOUND EA 15: CPT | Mod: GP | Performed by: PHYSICAL THERAPIST

## 2020-10-14 ENCOUNTER — THERAPY VISIT (OUTPATIENT)
Dept: PHYSICAL THERAPY | Facility: CLINIC | Age: 50
End: 2020-10-14
Payer: COMMERCIAL

## 2020-10-14 DIAGNOSIS — M72.2 PLANTAR FIBROMATOSIS: ICD-10-CM

## 2020-10-14 DIAGNOSIS — M54.42 CHRONIC LEFT-SIDED LOW BACK PAIN WITH LEFT-SIDED SCIATICA: Primary | ICD-10-CM

## 2020-10-14 DIAGNOSIS — G89.29 CHRONIC LEFT-SIDED LOW BACK PAIN WITH LEFT-SIDED SCIATICA: Primary | ICD-10-CM

## 2020-10-14 PROCEDURE — 97035 APP MDLTY 1+ULTRASOUND EA 15: CPT | Mod: GP | Performed by: PHYSICAL THERAPIST

## 2020-10-14 PROCEDURE — 97110 THERAPEUTIC EXERCISES: CPT | Mod: GP | Performed by: PHYSICAL THERAPIST

## 2020-10-14 PROCEDURE — 97140 MANUAL THERAPY 1/> REGIONS: CPT | Mod: GP | Performed by: PHYSICAL THERAPIST

## 2020-10-21 ENCOUNTER — THERAPY VISIT (OUTPATIENT)
Dept: PHYSICAL THERAPY | Facility: CLINIC | Age: 50
End: 2020-10-21
Payer: COMMERCIAL

## 2020-10-21 DIAGNOSIS — M72.2 PLANTAR FIBROMATOSIS: ICD-10-CM

## 2020-10-21 DIAGNOSIS — M54.42 CHRONIC LEFT-SIDED LOW BACK PAIN WITH LEFT-SIDED SCIATICA: Primary | ICD-10-CM

## 2020-10-21 DIAGNOSIS — G89.29 CHRONIC LEFT-SIDED LOW BACK PAIN WITH LEFT-SIDED SCIATICA: Primary | ICD-10-CM

## 2020-10-21 PROCEDURE — 97140 MANUAL THERAPY 1/> REGIONS: CPT | Mod: GP | Performed by: PHYSICAL THERAPIST

## 2020-10-21 PROCEDURE — 97035 APP MDLTY 1+ULTRASOUND EA 15: CPT | Mod: GP | Performed by: PHYSICAL THERAPIST

## 2020-10-21 PROCEDURE — 97110 THERAPEUTIC EXERCISES: CPT | Mod: GP | Performed by: PHYSICAL THERAPIST

## 2020-10-28 ENCOUNTER — THERAPY VISIT (OUTPATIENT)
Dept: PHYSICAL THERAPY | Facility: CLINIC | Age: 50
End: 2020-10-28
Payer: COMMERCIAL

## 2020-10-28 DIAGNOSIS — M72.2 PLANTAR FIBROMATOSIS: ICD-10-CM

## 2020-10-28 DIAGNOSIS — G89.29 CHRONIC LEFT-SIDED LOW BACK PAIN WITH LEFT-SIDED SCIATICA: Primary | ICD-10-CM

## 2020-10-28 DIAGNOSIS — M54.42 CHRONIC LEFT-SIDED LOW BACK PAIN WITH LEFT-SIDED SCIATICA: Primary | ICD-10-CM

## 2020-10-28 PROCEDURE — 97110 THERAPEUTIC EXERCISES: CPT | Mod: GP | Performed by: PHYSICAL THERAPIST

## 2020-10-28 PROCEDURE — 97035 APP MDLTY 1+ULTRASOUND EA 15: CPT | Mod: GP | Performed by: PHYSICAL THERAPIST

## 2020-10-28 PROCEDURE — 97112 NEUROMUSCULAR REEDUCATION: CPT | Mod: GP | Performed by: PHYSICAL THERAPIST

## 2020-11-12 ENCOUNTER — THERAPY VISIT (OUTPATIENT)
Dept: PHYSICAL THERAPY | Facility: CLINIC | Age: 50
End: 2020-11-12
Payer: COMMERCIAL

## 2020-11-12 DIAGNOSIS — G89.29 CHRONIC LEFT-SIDED LOW BACK PAIN WITH LEFT-SIDED SCIATICA: Primary | ICD-10-CM

## 2020-11-12 DIAGNOSIS — M54.42 CHRONIC LEFT-SIDED LOW BACK PAIN WITH LEFT-SIDED SCIATICA: Primary | ICD-10-CM

## 2020-11-12 DIAGNOSIS — M72.2 PLANTAR FIBROMATOSIS: ICD-10-CM

## 2020-11-12 PROCEDURE — 97140 MANUAL THERAPY 1/> REGIONS: CPT | Mod: GP | Performed by: PHYSICAL THERAPIST

## 2020-11-12 PROCEDURE — 97110 THERAPEUTIC EXERCISES: CPT | Mod: GP | Performed by: PHYSICAL THERAPIST

## 2020-11-18 ENCOUNTER — THERAPY VISIT (OUTPATIENT)
Dept: PHYSICAL THERAPY | Facility: CLINIC | Age: 50
End: 2020-11-18
Payer: COMMERCIAL

## 2020-11-18 DIAGNOSIS — G89.29 CHRONIC LEFT-SIDED LOW BACK PAIN WITH LEFT-SIDED SCIATICA: Primary | ICD-10-CM

## 2020-11-18 DIAGNOSIS — M72.2 PLANTAR FIBROMATOSIS: ICD-10-CM

## 2020-11-18 DIAGNOSIS — M54.42 CHRONIC LEFT-SIDED LOW BACK PAIN WITH LEFT-SIDED SCIATICA: Primary | ICD-10-CM

## 2020-11-18 PROCEDURE — 97140 MANUAL THERAPY 1/> REGIONS: CPT | Mod: GP | Performed by: PHYSICAL THERAPIST

## 2020-11-18 PROCEDURE — 97110 THERAPEUTIC EXERCISES: CPT | Mod: GP | Performed by: PHYSICAL THERAPIST

## 2020-12-03 ENCOUNTER — THERAPY VISIT (OUTPATIENT)
Dept: PHYSICAL THERAPY | Facility: CLINIC | Age: 50
End: 2020-12-03
Payer: COMMERCIAL

## 2020-12-03 DIAGNOSIS — M72.2 PLANTAR FIBROMATOSIS: ICD-10-CM

## 2020-12-03 DIAGNOSIS — M54.42 CHRONIC LEFT-SIDED LOW BACK PAIN WITH LEFT-SIDED SCIATICA: Primary | ICD-10-CM

## 2020-12-03 DIAGNOSIS — G89.29 CHRONIC LEFT-SIDED LOW BACK PAIN WITH LEFT-SIDED SCIATICA: Primary | ICD-10-CM

## 2020-12-03 PROCEDURE — 97110 THERAPEUTIC EXERCISES: CPT | Mod: GP | Performed by: PHYSICAL THERAPIST

## 2020-12-03 PROCEDURE — 97140 MANUAL THERAPY 1/> REGIONS: CPT | Mod: GP | Performed by: PHYSICAL THERAPIST

## 2020-12-04 PROBLEM — G89.29 CHRONIC LEFT-SIDED LOW BACK PAIN WITH LEFT-SIDED SCIATICA: Status: RESOLVED | Noted: 2020-09-30 | Resolved: 2020-12-04

## 2020-12-04 PROBLEM — M54.42 CHRONIC LEFT-SIDED LOW BACK PAIN WITH LEFT-SIDED SCIATICA: Status: RESOLVED | Noted: 2020-09-30 | Resolved: 2020-12-04

## 2020-12-04 PROBLEM — M72.2 PLANTAR FIBROMATOSIS: Status: RESOLVED | Noted: 2020-09-30 | Resolved: 2020-12-04

## 2020-12-04 NOTE — PROGRESS NOTES
DISCHARGE REPORT    Progress reporting period is from 9/30/20 to 12/3/20.       SUBJECTIVE  Subjective changes noted by patient:  Feeling mostly fine now.  Still sometimes a little sciatic discomfort in the glute.  Fibromatosis is improving with self massage.  Balance improving.  Gastroc stretch is feeling good.   Feels that today can be the last visit.     Current Pain level: (0-1/10).     Initial Pain level: 2/10.   Changes in function:  Yes (See Goal flowsheet attached for changes in current functional level)  Adverse reaction to treatment or activity: None    OBJECTIVE  Changes noted in objective findings:  Yes,   Objective: Lumbar AROM without limitation or pain.  Foot with palpable fibromatosis still present, but significantly diminished from when starting PT.     ASSESSMENT/PLAN  Updated problem list and treatment plan: Diagnosis 1:  LBP w/L radicular symptoms; L plantar fibromatosis    Will continue with HEP for any remaining deficits.  STG/LTGs have been met or progress has been made towards goals:  Yes (See Goal flow sheet completed today.)  Assessment of Progress: The patient's condition is improving.  Self Management Plans:  Patient has been instructed in a home treatment program.  I have re-evaluated this patient and find that the nature, scope, duration and intensity of the therapy is appropriate for the medical condition of the patient.  Radha continues to require the following intervention to meet STG and LTG's:  PT intervention is no longer required to meet STG/LTG.    Recommendations:  This patient is ready to be discharged from therapy and continue their home treatment program.    Please refer to the daily flowsheet for treatment today, total treatment time and time spent performing 1:1 timed codes.

## 2021-01-14 ENCOUNTER — HEALTH MAINTENANCE LETTER (OUTPATIENT)
Age: 51
End: 2021-01-14

## 2021-03-08 ENCOUNTER — IMMUNIZATION (OUTPATIENT)
Dept: PEDIATRICS | Facility: CLINIC | Age: 51
End: 2021-03-08
Payer: COMMERCIAL

## 2021-03-08 PROCEDURE — 0001A PR COVID VAC PFIZER DIL RECON 30 MCG/0.3 ML IM: CPT

## 2021-03-08 PROCEDURE — 91300 PR COVID VAC PFIZER DIL RECON 30 MCG/0.3 ML IM: CPT

## 2021-03-29 ENCOUNTER — IMMUNIZATION (OUTPATIENT)
Dept: PEDIATRICS | Facility: CLINIC | Age: 51
End: 2021-03-29
Attending: INTERNAL MEDICINE
Payer: COMMERCIAL

## 2021-03-29 PROCEDURE — 91300 PR COVID VAC PFIZER DIL RECON 30 MCG/0.3 ML IM: CPT

## 2021-03-29 PROCEDURE — 0002A PR COVID VAC PFIZER DIL RECON 30 MCG/0.3 ML IM: CPT

## 2021-05-12 ENCOUNTER — TRANSFERRED RECORDS (OUTPATIENT)
Dept: HEALTH INFORMATION MANAGEMENT | Facility: CLINIC | Age: 51
End: 2021-05-12

## 2021-05-12 LAB
HEP C HIM: NORMAL
HIV 1&2 EXT: NORMAL

## 2021-08-06 NOTE — PROGRESS NOTES
SUBJECTIVE:   CC: Radha Bonilla is an 51 year old woman who presents for preventive health visit.     Patient has been advised of split billing requirements and indicates understanding: Yes     Healthy Habits:     Getting at least 3 servings of Calcium per day:  Yes    Bi-annual eye exam:  Yes    Dental care twice a year:  NO    Sleep apnea or symptoms of sleep apnea:  Daytime drowsiness    Diet:  Regular (no restrictions)    Frequency of exercise:  4-5 days/week    Duration of exercise:  N/A    Taking medications regularly:  Not Applicable    Medication side effects:  Not applicable    PHQ-2 Total Score: 0    Additional concerns today:  No    52yo here to establish care with primary care.  Sees ob/gyn for pap f/u due to abnls, issues with surgery.    Doing well overall.  Teaches a lot of yoga, and loves it.    Lipids, unsure if checked previously, no records to review at least.  Is fasting today.  Does have fam h/o- MGM.            Today's PHQ-2 Score:   PHQ-2 ( 1999 [employer]) 8/7/2021   Q1: Little interest or pleasure in doing things 0   Q2: Feeling down, depressed or hopeless 0   PHQ-2 Score 0   Q1: Little interest or pleasure in doing things Not at all   Q2: Feeling down, depressed or hopeless Not at all   PHQ-2 Score 0       Abuse: Current or Past (Physical, Sexual or Emotional) - No  Do you feel safe in your environment? Yes    Have you ever done Advance Care Planning? (For example, a Health Directive, POLST, or a discussion with a medical provider or your loved ones about your wishes): No, advance care planning information given to patient to review.  Advanced care planning was discussed at today's visit.    Social History     Tobacco Use     Smoking status: Never Smoker     Smokeless tobacco: Never Used   Substance Use Topics     Alcohol use: Yes     Comment: SOCIALLY     If you drink alcohol do you typically have >3 drinks per day or >7 drinks per week? YES      AUDIT - Alcohol Use Disorders  Identification Test - Reproduced from the World Health Organization Audit 2001 (Second Edition) 8/7/2021   1.  How often do you have a drink containing alcohol? 2 to 3 times a week   2.  How many drinks containing alcohol do you have on a typical day when you are drinking? 1 or 2   3.  How often do you have five or more drinks on one occasion? Less than monthly   4.  How often during the last year have you found that you were not able to stop drinking once you had started? Never   5.  How often during the last year have you failed to do what was normally expected of you because of drinking? Never   6.  How often during the last year have you needed a first drink in the morning to get yourself going after a heavy drinking session? Never   7.  How often during the last year have you had a feeling of guilt or remorse after drinking? Never   8.  How often during the last year have you been unable to remember what happened the night before because of your drinking? Never   9.  Have you or someone else been injured because of your drinking? No   10. Has a relative, friend, doctor or other health care worker been concerned about your drinking or suggested you cut down? No   TOTAL SCORE 4       Reviewed orders with patient.  Reviewed health maintenance and updated orders accordingly - Yes        Lab work is in process  Labs reviewed in EPIC  BP Readings from Last 3 Encounters:   08/10/21 122/82   09/15/20 126/87   09/03/20 124/68    Wt Readings from Last 3 Encounters:   08/10/21 71.7 kg (158 lb)   09/15/20 72.1 kg (159 lb)   09/03/20 68 kg (150 lb)               Patient Active Problem List   Diagnosis     CARDIOVASCULAR SCREENING; LDL GOAL LESS THAN 160     S/P hysterectomy     Post-operative pain     Screening for human papillomavirus     Past Surgical History:   Procedure Laterality Date     CERVIX REMOVAL  01/2020    HPV related, burst stitches soon afterwards, restitched     DILATION AND CURETTAGE       DILATION AND  CURETTAGE N/A 2020    Procedure: EXAM UNDER ANESTHESIA  REPAIR OF VAGINAL CUFF;  Surgeon: Haritha Metz MD;  Location:  OR     LAPAROSCOPIC HYSTERECTOMY SUPRACERVICAL  2013    Procedure: LAPAROSCOPIC HYSTERECTOMY SUPRACERVICAL;  LAPAROSCOPIC SUPRACERVICAL HYSTERECTOMY,BILATERAL SALPINGECTOMY;  Surgeon: Olivier Plasencia MD;  Location:  OR       Social History     Tobacco Use     Smoking status: Never Smoker     Smokeless tobacco: Never Used   Substance Use Topics     Alcohol use: Yes     Comment: SOCIALLY     Family History   Problem Relation Age of Onset     Depression Mother      Anxiety Disorder Mother      Diabetes Father 75        oral meds?     Hypertension Father      Hyperlipidemia Father      Coronary Artery Disease Maternal Grandmother         multiple MIs, first in 70s,  at 98     Hyperlipidemia Maternal Grandmother         strict diet after dx/MI     Other Cancer Maternal Grandfather      Diabetes Paternal Grandmother          at 81, declined foot amputation         Current Outpatient Medications   Medication Sig Dispense Refill     ibuprofen (ADVIL/MOTRIN) 200 MG tablet Take 600 mg by mouth 2 times daily as needed for mild pain       MAGNESIUM PO Take 1 tablet by mouth every morning strength unknown       Omega-3 Fatty Acids (FISH OIL OMEGA-3 PO) Take 1 capsule by mouth every morning strength unknown       Vitamin D3 (CHOLECALCIFEROL) 25 mcg (1000 units) tablet Take 25 mcg by mouth daily       Allergies   Allergen Reactions     No Known Allergies      Recent Labs   Lab Test 203 14  1603   CR 0.80  --    GFRESTIMATED 84  --    GFRESTBLACK >90  --    TSH  --  1.30        Breast Cancer Screening:  Any new diagnosis of family breast, ovarian, or bowel cancer? No    FHS-7: No flowsheet data found.    Mammogram Screening: Recommended annual mammography  Pertinent mammograms are reviewed under the imaging tab.    History of abnormal Pap smear: YES - SENA 2/3 on biopsy  "- PAP/HPV co-testing at 12, 24 months.  If two negative results repeat co-testing in 3 years, if negative then routine screening.  Pt will cont f/u with Dr. Plasencia for paps- records requested today.     Reviewed and updated as needed this visit by clinical staff  Tobacco  Allergies  Meds  Problems  Med Hx  Surg Hx  Fam Hx  Soc Hx          Reviewed and updated as needed this visit by Provider  Tobacco  Allergies  Meds  Problems  Med Hx  Surg Hx  Fam Hx             Review of Systems   Constitutional: Negative for chills and fever.   HENT: Negative for congestion, ear pain, hearing loss and sore throat.    Eyes: Negative for pain and visual disturbance.   Respiratory: Negative for cough and shortness of breath.    Cardiovascular: Negative for chest pain, palpitations and peripheral edema.   Gastrointestinal: Negative for abdominal pain, constipation, diarrhea, heartburn, hematochezia and nausea.   Breasts:  Negative for tenderness, breast mass and discharge.   Genitourinary: Negative for dysuria, frequency, genital sores, hematuria, pelvic pain, urgency, vaginal bleeding and vaginal discharge.   Musculoskeletal: Negative for arthralgias, joint swelling and myalgias.   Skin: Negative for rash.   Neurological: Negative for dizziness, weakness, headaches and paresthesias.   Psychiatric/Behavioral: Negative for mood changes. The patient is not nervous/anxious.         OBJECTIVE:   /82   Pulse 71   Temp 98.7  F (37.1  C) (Tympanic)   Resp 16   Ht 1.485 m (4' 10.47\")   Wt 71.7 kg (158 lb)   LMP 09/07/2013   SpO2 100%   BMI 32.50 kg/m    Physical Exam  GENERAL: healthy, alert and no distress  EYES: Eyes grossly normal to inspection, PERRL and conjunctivae and sclerae normal  HENT: ear canals and TM's normal, nose and mouth without ulcers or lesions  NECK: no adenopathy, no asymmetry, masses, or scars and thyroid normal to palpation  RESP: lungs clear to auscultation - no rales, rhonchi or " wheezes  BREAST: normal without masses, tenderness or nipple discharge and no palpable axillary masses or adenopathy  CV: regular rate and rhythm, normal S1 S2, no S3 or S4, no murmur, click or rub, no peripheral edema and peripheral pulses strong  ABDOMEN: soft, nontender, no hepatosplenomegaly, no masses and bowel sounds normal  MS: no gross musculoskeletal defects noted, no edema  SKIN: no suspicious lesions or rashes  NEURO: Normal strength and tone, mentation intact and speech normal  PSYCH: mentation appears normal, affect normal/bright    Diagnostic Test Results:  Labs reviewed in Epic    ASSESSMENT/PLAN:       ICD-10-CM    1. Routine general medical examination at a health care facility  Z00.00 Lipid panel reflex to direct LDL Fasting     Basic metabolic panel  (Ca, Cl, CO2, Creat, Gluc, K, Na, BUN)     HIV Antigen Antibody Combo     Hepatitis C Screen Reflex to HCV RNA Quant and Genotype     Lipid panel reflex to direct LDL Fasting     Basic metabolic panel  (Ca, Cl, CO2, Creat, Gluc, K, Na, BUN)     HIV Antigen Antibody Combo     Hepatitis C Screen Reflex to HCV RNA Quant and Genotype   2. Screening for human papillomavirus  Z11.51    3. CARDIOVASCULAR SCREENING; LDL GOAL LESS THAN 160  Z13.6 Lipid panel reflex to direct LDL Fasting     Lipid panel reflex to direct LDL Fasting   4. Family history of skin cancer  Z80.8 Adult Dermatology Referral   5. Skin lesion  L98.9 Adult Dermatology Referral     CPE- 52 yo establishing care for primary care.  Does see ob/gyn more regularly lately. We discussed ways to maintain a healthy lifestyle with sensible eating, regular exercise and self cares. We dicussed calcium and Vitamin D intake, vaccinations and preventive health screens.  See orders above for tests ordered and screening needed.  Fasting today- will check labs as above.  Thin prep pap was not done- she'll cont follow-up with Dr. Plasnecia.    Did sign KAVON to request records today, though.  Tdap -  "Risks/benefits discussed, given today.   Reviewed recs re: Shingrix immunization.    Patient has been advised of split billing requirements and indicates understanding: Yes  COUNSELING:  Reviewed preventive health counseling, as reflected in patient instructions    Estimated body mass index is 32.5 kg/m  as calculated from the following:    Height as of this encounter: 1.485 m (4' 10.47\").    Weight as of this encounter: 71.7 kg (158 lb).    Weight management plan: Discussed healthy diet and exercise guidelines    She reports that she has never smoked. She has never used smokeless tobacco.      Counseling Resources:  ATP IV Guidelines  Pooled Cohorts Equation Calculator  Breast Cancer Risk Calculator  BRCA-Related Cancer Risk Assessment: FHS-7 Tool  FRAX Risk Assessment  ICSI Preventive Guidelines  Dietary Guidelines for Americans, 2010  USDA's MyPlate  ASA Prophylaxis  Lung CA Screening    Maria Luisa Louie MD  [facility]  "

## 2021-08-07 ASSESSMENT — ENCOUNTER SYMPTOMS
PALPITATIONS: 0
HEMATURIA: 0
CHILLS: 0
SHORTNESS OF BREATH: 0
PARESTHESIAS: 0
MYALGIAS: 0
JOINT SWELLING: 0
HEARTBURN: 0
FREQUENCY: 0
SORE THROAT: 0
HEMATOCHEZIA: 0
ABDOMINAL PAIN: 0
EYE PAIN: 0
NAUSEA: 0
DIARRHEA: 0
DIZZINESS: 0
BREAST MASS: 0
CONSTIPATION: 0
ARTHRALGIAS: 0
FEVER: 0
WEAKNESS: 0
COUGH: 0
NERVOUS/ANXIOUS: 0
HEADACHES: 0
DYSURIA: 0

## 2021-08-10 ENCOUNTER — OFFICE VISIT (OUTPATIENT)
Dept: FAMILY MEDICINE | Facility: CLINIC | Age: 51
End: 2021-08-10
Payer: COMMERCIAL

## 2021-08-10 VITALS
HEIGHT: 58 IN | OXYGEN SATURATION: 100 % | DIASTOLIC BLOOD PRESSURE: 82 MMHG | BODY MASS INDEX: 33.17 KG/M2 | WEIGHT: 158 LBS | TEMPERATURE: 98.7 F | SYSTOLIC BLOOD PRESSURE: 122 MMHG | RESPIRATION RATE: 16 BRPM | HEART RATE: 71 BPM

## 2021-08-10 DIAGNOSIS — Z80.8 FAMILY HISTORY OF SKIN CANCER: ICD-10-CM

## 2021-08-10 DIAGNOSIS — Z00.00 ROUTINE GENERAL MEDICAL EXAMINATION AT A HEALTH CARE FACILITY: Primary | ICD-10-CM

## 2021-08-10 DIAGNOSIS — L98.9 SKIN LESION: ICD-10-CM

## 2021-08-10 DIAGNOSIS — Z11.51 SCREENING FOR HUMAN PAPILLOMAVIRUS: ICD-10-CM

## 2021-08-10 DIAGNOSIS — Z13.6 CARDIOVASCULAR SCREENING; LDL GOAL LESS THAN 160: ICD-10-CM

## 2021-08-10 PROCEDURE — 90471 IMMUNIZATION ADMIN: CPT | Performed by: FAMILY MEDICINE

## 2021-08-10 PROCEDURE — 36415 COLL VENOUS BLD VENIPUNCTURE: CPT | Performed by: FAMILY MEDICINE

## 2021-08-10 PROCEDURE — 86803 HEPATITIS C AB TEST: CPT | Performed by: FAMILY MEDICINE

## 2021-08-10 PROCEDURE — 87389 HIV-1 AG W/HIV-1&-2 AB AG IA: CPT | Performed by: FAMILY MEDICINE

## 2021-08-10 PROCEDURE — 90715 TDAP VACCINE 7 YRS/> IM: CPT | Performed by: FAMILY MEDICINE

## 2021-08-10 PROCEDURE — 80048 BASIC METABOLIC PNL TOTAL CA: CPT | Performed by: FAMILY MEDICINE

## 2021-08-10 PROCEDURE — 80061 LIPID PANEL: CPT | Performed by: FAMILY MEDICINE

## 2021-08-10 PROCEDURE — 99386 PREV VISIT NEW AGE 40-64: CPT | Mod: 25 | Performed by: FAMILY MEDICINE

## 2021-08-10 RX ORDER — VITAMIN B COMPLEX
25 TABLET ORAL DAILY
COMMUNITY

## 2021-08-10 ASSESSMENT — ENCOUNTER SYMPTOMS
DIZZINESS: 0
NAUSEA: 0
WEAKNESS: 0
NERVOUS/ANXIOUS: 0
DIARRHEA: 0
HEADACHES: 0
ABDOMINAL PAIN: 0
CONSTIPATION: 0
HEARTBURN: 0
COUGH: 0
FEVER: 0
MYALGIAS: 0
EYE PAIN: 0
PARESTHESIAS: 0
DYSURIA: 0
FREQUENCY: 0
HEMATOCHEZIA: 0
JOINT SWELLING: 0
CHILLS: 0
BREAST MASS: 0
PALPITATIONS: 0
ARTHRALGIAS: 0
SORE THROAT: 0
HEMATURIA: 0
SHORTNESS OF BREATH: 0

## 2021-08-10 ASSESSMENT — MIFFLIN-ST. JEOR: SCORE: 1218.81

## 2021-08-11 LAB
ANION GAP SERPL CALCULATED.3IONS-SCNC: 7 MMOL/L (ref 3–14)
BUN SERPL-MCNC: 12 MG/DL (ref 7–30)
CALCIUM SERPL-MCNC: 9.9 MG/DL (ref 8.5–10.1)
CHLORIDE BLD-SCNC: 105 MMOL/L (ref 94–109)
CHOLEST SERPL-MCNC: 267 MG/DL
CO2 SERPL-SCNC: 26 MMOL/L (ref 20–32)
CREAT SERPL-MCNC: 0.87 MG/DL (ref 0.52–1.04)
FASTING STATUS PATIENT QL REPORTED: YES
GFR SERPL CREATININE-BSD FRML MDRD: 76 ML/MIN/1.73M2
GLUCOSE BLD-MCNC: 94 MG/DL (ref 70–99)
HCV AB SERPL QL IA: NONREACTIVE
HDLC SERPL-MCNC: 72 MG/DL
HIV 1+2 AB+HIV1 P24 AG SERPL QL IA: NONREACTIVE
LDLC SERPL CALC-MCNC: 163 MG/DL
NONHDLC SERPL-MCNC: 195 MG/DL
POTASSIUM BLD-SCNC: 4.2 MMOL/L (ref 3.4–5.3)
SODIUM SERPL-SCNC: 138 MMOL/L (ref 133–144)
TRIGL SERPL-MCNC: 160 MG/DL

## 2021-08-16 NOTE — RESULT ENCOUNTER NOTE
Here are your lab results from your recent visit...  -Your HIV and hepatitis C antibody screening tests are negative.   -Your basic metabolic panel (which includes electrolyte levels, blood sugar level and kidney function tests) looks normal.  -Your cholesterol panel looks abnormal with a high LDL (the bad cholesterol), though your HDL (the good cholesterol) looks very good. Your triglycerides are also a bit high. The higher LDL and triglyceride levels can often be improved by increasing the fruits, vegetables, legumes, and whole grains in your diet, and working on cutting back on foods with high saturated fats and simple sugars/carbohydrates (and processed foods).  I would recommend checking these again in a year (call or mychart if you would be able to come in for a fasting lipid/glucose lab check prior to your physical appointment), and feel free to make a video (or clinic) appointment if you'd like to talk about details/options in further depth.      Please let me know if you have any questions.  Best,   Malcolm Louie MD

## 2021-11-04 ENCOUNTER — OFFICE VISIT (OUTPATIENT)
Dept: DERMATOLOGY | Facility: CLINIC | Age: 51
End: 2021-11-04
Attending: FAMILY MEDICINE
Payer: COMMERCIAL

## 2021-11-04 DIAGNOSIS — Z12.83 SKIN CANCER SCREENING: ICD-10-CM

## 2021-11-04 DIAGNOSIS — L98.9 SKIN LESION: ICD-10-CM

## 2021-11-04 DIAGNOSIS — D18.01 CHERRY ANGIOMA: Primary | ICD-10-CM

## 2021-11-04 DIAGNOSIS — Z80.8 FAMILY HISTORY OF SKIN CANCER: ICD-10-CM

## 2021-11-04 PROCEDURE — 99203 OFFICE O/P NEW LOW 30 MIN: CPT | Performed by: DERMATOLOGY

## 2021-11-04 ASSESSMENT — PAIN SCALES - GENERAL: PAINLEVEL: NO PAIN (0)

## 2021-11-04 NOTE — PATIENT INSTRUCTIONS
Patient Education     Ganglion Cyst: Foot  A ganglion is a fluid-filled swelling of the lining of a joint or tendon. Ganglions can form on any part of the foot. But they most often appear on the ankle or top of the foot. Ganglions tend to change in size and often grow slowly.  Causes  Repeated irritation can weaken the lining of a joint or tendon. This can lead to ganglions. Bony outgrowths (bone spurs) and arthritis may also cause ganglions by irritating the joints and tendons.  Symptoms  Ganglions often form with no symptoms. But the ganglion may put pressure on the nerves and the overlying skin. This can cause tingling, numbness, or pain. Ganglions sometimes swell. Their size can change with different activities or a change in weather.  How are they diagnosed?  Ganglions are sometimes mistaken for tumors. So it s important to have a complete exam done. Tests may be done to confirm the diagnosis.  Health history  Your healthcare provider will ask you questions. These include how long you ve had the ganglion and what kind of symptoms you re feeling. He or she may ask if it s changed in size or if its size varies based on your activities.  Physical exam  Your healthcare provider may do a translumination exam. This involves shining a light through the swelling. You can often see through a ganglion, but not through a tumor. When your foot is pressed (palpated), a ganglion feels spongy and the fluid moves from side to side.  Tests  If a bone spur or arthritis is suspected, X-rays may be needed. Fluid removal (needle aspiration) may be done. It also helps to decrease pain. To confirm a ganglion, MRI may be done. This reveals images of soft tissue and bone. Sometimes, special dyes may be injected into the area to show the outline.  How are ganglions treated?  Ganglions may be hard to treat without surgery. But nonsurgical methods may be helpful in relieving some of your symptoms.  Nonsurgical care    Pads placed around  the ganglion can ease pressure and friction.    Fluid removal may also relieve symptoms. This is done with a needle. A steroid may be injected at the same time. But ganglions may come back.    Limiting movements or activities that increase pain may bring relief.    Icing the ganglion for 15 to 20 minutes may relieve inflammation and pain for a short time.    If inflammation is severe, your healthcare provider may treat your symptoms with medicine.  Surgical treatment  Surgery may be needed if a ganglion is causing ongoing or severe pain. The entire ganglion wall is removed during the procedure. Some nearby tissue may also be removed.  After surgery  You may feel pain, swelling, numbness, or tingling for several weeks following surgery. You should be able to walk soon afterward. But your foot may need to be wrapped or in a cast, boot, or hard shoe. See your healthcare provider if you notice any future problems. Surgery is often successful. But there is a chance that the ganglion will come back.  Trace last reviewed this educational content on 5/1/2018 2000-2021 [employer]. All rights reserved. This information is not intended as a substitute for professional medical care. Always follow your healthcare professional's instructions.

## 2021-11-04 NOTE — LETTER
11/4/2021       RE: Radha Bonilla  [address] [address]     Dear Colleague,    Thank you for referring your patient, Radha Bonilla, to the [facility] DERMATOLOGY [facility]IC [place] at [facility]. Please see a copy of my visit note below.    [facility] Dermatology Note  Encounter Date: Nov 4, 2021  Office Visit     Dermatology Problem List:  1. Digital Myxoid Cyst 11/4  - Patient elects to monitor  ____________________________________________    Assessment & Plan:    # Digital Myxoid Cyst   - Benign under dermoscopy, non-inflamed, non-painful.  - Reassured benign in nature and collection of mucin under the skin.   Cyst management: Options were reviewed. The patient elects for clinical monitoring at this time.     # Cherry angiomas.  - Reassurance as to benign nature    # Benign skin cancer screening exam.  - Reassurance  - Reviewed ABCDEs of melanoma, s/sx of nonmelanoma skin cancer    Follow-up: prn for new or changing lesions    Staff and Medical Student:     Vicente Leung MS-IV   Staffed with Jw Kinney MD    I was present with the medical student who participated in the service and in the documentation.  I have verified the history and personally performed the physical exam and medical decision making.  I agree with the assessment and plan of care as documented in the note.    Jw Snyder MD  Dermatology Attending  ____________________________________________    CC: Skin Check (Radha is here today for a general skin exam. She states that she has a spot of concern on the left foot, 1st toe. )    HPI:  Ms. Radha Bonilla is a(n) 51 year old female who presents today as a new patient for skin check and lesion on the left toe.     One year ago noted a blister on the left big toe. Stuck a needle in it and no fluid came out. Reddened, swelled up, and now purplish color. No burning,  stinging, pain. Patient states that it occassionally grows and hardens.  Patient is otherwise feeling well, without additional skin concerns.    Family History:  Patient states that summer of 2021 sister was diagnosed with non-melanoma skin cancer and had an excision. Father also had skin cancer unknown but non-melanoma.      Social History: , always barefoot, does not use sunscreen.     Physical Exam:  Vitals: LMP 09/07/2013   SKIN: Total skin excluding the undergarment areas was performed. The exam included the head/face, neck, both arms, chest, back, abdomen, both legs, digits and/or nails.   - Dobson type I/II, diffuse ephelides throughout the upper, lower extremities, face and chest.  - brown pigmented macules and papules with a reassuring patterns.  - bright red vascular papules: located on the back, trunk, torso.  - flat well-circumbscirbed patches characteristic of solar lentigo on the arms and legs  - 8 mm red papule located on the left chest.  - The left lower extremity on the first toe digit on the dorsal aspect has a soft purple-blue fluid-filled cyst, without inflammation. There is no draining, hemorrhaging, or pus in the region.   - No other lesions of concern on areas examined.     Medications:  Current Outpatient Medications   Medication     ibuprofen (ADVIL/MOTRIN) 200 MG tablet     MAGNESIUM PO     Omega-3 Fatty Acids (FISH OIL OMEGA-3 PO)     Vitamin D3 (CHOLECALCIFEROL) 25 mcg (1000 units) tablet     No current facility-administered medications for this visit.      Past Medical History:   Patient Active Problem List   Diagnosis     CARDIOVASCULAR SCREENING; LDL GOAL LESS THAN 160     S/P hysterectomy     Post-operative pain     Screening for human papillomavirus     Past Medical History:   Diagnosis Date     Gastro-oesophageal reflux disease      Menorrhagia         CC Maria Luisa Louie MD  [address] [address] on close of this encounter.

## 2021-11-04 NOTE — PROGRESS NOTES
[facility] Dermatology Note  Encounter Date: Nov 4, 2021  Office Visit     Dermatology Problem List:  1. Digital Myxoid Cyst 11/4  - Patient elects to monitor  ____________________________________________    Assessment & Plan:    # Digital Myxoid Cyst   - Benign under dermoscopy, non-inflamed, non-painful.  - Reassured benign in nature and collection of mucin under the skin.   Cyst management: Options were reviewed. The patient elects for clinical monitoring at this time.     # Cherry angiomas.  - Reassurance as to benign nature    # Benign skin cancer screening exam.  - Reassurance  - Reviewed ABCDEs of melanoma, s/sx of nonmelanoma skin cancer    Follow-up: prn for new or changing lesions    Staff and Medical Student:     Vicente Leung MS-IV   Staffed with Jw Kinney MD    I was present with the medical student who participated in the service and in the documentation.  I have verified the history and personally performed the physical exam and medical decision making.  I agree with the assessment and plan of care as documented in the note.    Jw Snyder MD  Dermatology Attending  ____________________________________________    CC: Skin Check (Radha is here today for a general skin exam. She states that she has a spot of concern on the left foot, 1st toe. )    HPI:  Ms. Radha Bonilla is a(n) 51 year old female who presents today as a new patient for skin check and lesion on the left toe.     One year ago noted a blister on the left big toe. Stuck a needle in it and no fluid came out. Reddened, swelled up, and now purplish color. No burning, stinging, pain. Patient states that it occassionally grows and hardens.  Patient is otherwise feeling well, without additional skin concerns.    Family History:  Patient states that summer of 2021 sister was diagnosed with non-melanoma skin cancer and had an excision. Father also had skin cancer unknown but non-melanoma.      Social  History: , always barefoot, does not use sunscreen.     Physical Exam:  Vitals: LMP 09/07/2013   SKIN: Total skin excluding the undergarment areas was performed. The exam included the head/face, neck, both arms, chest, back, abdomen, both legs, digits and/or nails.   - Dobson type I/II, diffuse ephelides throughout the upper, lower extremities, face and chest.  - brown pigmented macules and papules with a reassuring patterns.  - bright red vascular papules: located on the back, trunk, torso.  - flat well-circumbscirbed patches characteristic of solar lentigo on the arms and legs  - 8 mm red papule located on the left chest.  - The left lower extremity on the first toe digit on the dorsal aspect has a soft purple-blue fluid-filled cyst, without inflammation. There is no draining, hemorrhaging, or pus in the region.   - No other lesions of concern on areas examined.     Medications:  Current Outpatient Medications   Medication     ibuprofen (ADVIL/MOTRIN) 200 MG tablet     MAGNESIUM PO     Omega-3 Fatty Acids (FISH OIL OMEGA-3 PO)     Vitamin D3 (CHOLECALCIFEROL) 25 mcg (1000 units) tablet     No current facility-administered medications for this visit.      Past Medical History:   Patient Active Problem List   Diagnosis     CARDIOVASCULAR SCREENING; LDL GOAL LESS THAN 160     S/P hysterectomy     Post-operative pain     Screening for human papillomavirus     Past Medical History:   Diagnosis Date     Gastro-oesophageal reflux disease      Menorrhagia         CC Maria Luisa Louie MD  [address] [facility]  [address]  [place] [address] on close of this encounter.

## 2021-11-04 NOTE — NURSING NOTE
Dermatology Rooming Note    Radha Bonilla's goals for this visit include:   Chief Complaint   Patient presents with     Skin Check     Radha is here today for a general skin exam. She states that she has a spot of concern on the left foot, 1st toe.      Dmitry Iniguez, EMT

## 2021-11-27 PROBLEM — D18.01 CHERRY ANGIOMA: Status: ACTIVE | Noted: 2021-11-27

## 2021-11-27 PROBLEM — Z80.8 FAMILY HISTORY OF SKIN CANCER: Status: ACTIVE | Noted: 2021-11-27

## 2021-11-27 PROBLEM — L98.9 SKIN LESION: Status: ACTIVE | Noted: 2021-11-27

## 2021-11-27 PROBLEM — Z12.83 SKIN CANCER SCREENING: Status: ACTIVE | Noted: 2021-11-27

## 2021-12-19 ENCOUNTER — HEALTH MAINTENANCE LETTER (OUTPATIENT)
Age: 51
End: 2021-12-19

## 2022-10-15 ENCOUNTER — HEALTH MAINTENANCE LETTER (OUTPATIENT)
Age: 52
End: 2022-10-15

## 2023-03-25 ENCOUNTER — HEALTH MAINTENANCE LETTER (OUTPATIENT)
Age: 53
End: 2023-03-25

## 2023-10-29 ENCOUNTER — HEALTH MAINTENANCE LETTER (OUTPATIENT)
Age: 53
End: 2023-10-29

## 2024-01-18 ENCOUNTER — TELEPHONE (OUTPATIENT)
Dept: FAMILY MEDICINE | Facility: CLINIC | Age: 54
End: 2024-01-18
Payer: COMMERCIAL

## 2024-01-18 DIAGNOSIS — R19.5 POSITIVE COLORECTAL CANCER SCREENING USING COLOGUARD TEST: ICD-10-CM

## 2024-01-18 DIAGNOSIS — Z12.11 SCREENING FOR COLON CANCER: Primary | ICD-10-CM

## 2024-01-18 NOTE — TELEPHONE ENCOUNTER
Triage,   Please see message below and advise.   Last appt w/ CW was on 8/10/21.  Thanks!  Marcia LANDON

## 2024-01-18 NOTE — TELEPHONE ENCOUNTER
Order/Referral Request    Who is requesting: patient    Orders being requested: Colonoscopy    Reason service is needed/diagnosis: Patients colo guard was positive    When are orders needed by: anytime    Has this been discussed with Provider: No    Does patient have a preference on a Group/Provider/Facility? no    Does patient have an appointment scheduled?: No    Where to send orders: Place orders within Epic    Could we send this information to you in [facility] or would you prefer to receive a phone call?:   Patient would prefer a phone call   Okay to leave a detailed message?: Yes at Cell number on file:    Telephone Information:   Mobile [phone]

## 2024-01-18 NOTE — TELEPHONE ENCOUNTER
CW,      Patient called.  States she had a Cologuard test that came back positive  Last seen 8/10/2021.    Asked patient who ordered the test, states she just received in the mail and hasn't seen an doctor since she last saw you in 2021.    Scheduled patient for her physical with you on 2/16.  Needs referral for colonoscopy.    Patient states she called Cologuard and was told that majority of their tests come back false positive and ok to wait until she sees a doctor.(?)    Referral  T'd up.    Thank you,  Beth Turner, RN

## 2024-01-19 NOTE — TELEPHONE ENCOUNTER
Thanks for setting up appt- I did go ahead and order the referral for colonoscopy so she can get it scheduled.  Thanks!  CW

## 2024-01-25 ENCOUNTER — TELEPHONE (OUTPATIENT)
Dept: GASTROENTEROLOGY | Facility: CLINIC | Age: 54
End: 2024-01-25
Payer: COMMERCIAL

## 2024-01-25 NOTE — TELEPHONE ENCOUNTER
"Endoscopy Scheduling Screen    Have you had a positive Covid test in the last 14 days?  No    Are you active on MyChart?   Yes    What insurance is in the chart?  Other:  [facility]    Ordering/Referring Provider:     MIRNA YARBROUGH      (If ordering provider performs procedure, schedule with ordering provider unless otherwise instructed. )    BMI: Estimated body mass index is 32.5 kg/m  as calculated from the following:    Height as of 8/10/21: 1.485 m (4' 10.47\").    Weight as of 8/10/21: 71.7 kg (158 lb).     Sedation Ordered  moderate sedation.   If patient BMI > 50 do not schedule in [facility].    If patient BMI > 45 do not schedule at [facility].    Are you taking methadone or Suboxone?  No    Have you had difficulties, pain, or discomfort during past endoscopy procedures?  No    Are you taking any prescription medications for pain 3 or more times per week?   NO - No RN review required.    Do you have a history of malignant hyperthermia or adverse reaction to anesthesia?  No    (Females) Are you currently pregnant?   No     Have you been diagnosed or told you have pulmonary hypertension?   No    Do you have an LVAD?  No    Have you been told you have moderate to severe sleep apnea?  No    Have you been told you have COPD, asthma, or any other lung disease?  No    Do you have any heart conditions?  No     Have you ever had an organ transplant?   No    Have you ever had or are you awaiting a heart or lung transplant?   No    Have you had a stroke or transient ischemic attack (TIA aka \"mini stroke\" in the last 6 months?   No    Have you been diagnosed with or been told you have cirrhosis of the liver?   No    Are you currently on dialysis?   No    Do you need assistance transferring?   No    BMI: Estimated body mass index is 32.5 kg/m  as calculated from the following:    Height as of 8/10/21: 1.485 m (4' 10.47\").    Weight as of 8/10/21: 71.7 kg (158 lb).     Is patients BMI > 40 and scheduling location [facility]?  No    Do " you take an injectable medication for weight loss or diabetes (excluding insulin)?  No    Do you take the medication Naltrexone?  No    Do you take blood thinners?  No       Prep   Are you currently on dialysis or do you have chronic kidney disease?  No    Do you have a diagnosis of diabetes?  No    Do you have a diagnosis of cystic fibrosis (CF)?  No    On a regular basis do you go 3 -5 days between bowel movements?  No    BMI > 40?  No    Preferred Pharmacy:    [facility][id number] [address] [address]  Phone: [phone] Fax: [phone]      Final Scheduling Details   Colonoscopy prep sent?  Standard MiraLAX    Procedure scheduled  Colonoscopy    Surgeon:  Gloria     Date of procedure:  2/5     Pre-OP / PAC:   No - Not required for this site.    Location  [facility] - [facility] - Patient preference.    Sedation   Moderate Sedation - Per order.      Patient Reminders:   You will receive a call from a Nurse to review instructions and health history.  This assessment must be completed prior to your procedure.  Failure to complete the Nurse assessment may result in the procedure being cancelled.      On the day of your procedure, please designate an adult(s) who can drive you home stay with you for the next 24 hours. The medicines used in the exam will make you sleepy. You will not be able to drive.      You cannot take public transportation, ride share services, or non-medical taxi service without a responsible caregiver.  Medical transport services are allowed with the requirement that a responsible caregiver will receive you at your destination.  We require that drivers and caregivers are confirmed prior to your procedure.

## 2024-01-26 ENCOUNTER — TELEPHONE (OUTPATIENT)
Dept: GASTROENTEROLOGY | Facility: CLINIC | Age: 54
End: 2024-01-26

## 2024-01-26 NOTE — TELEPHONE ENCOUNTER
Pre assessment completed for upcoming procedure.   (Please see previous telephone encounter notes for complete details)    Patient  returned call.       Procedure details:    Arrival time and facility location reviewed.    Pre op exam needed? N/A    Designated  policy reviewed. Instructed to have someone stay 6 hours post procedure.     COVID policy reviewed.      Medication review:    Medications reviewed. Please see supporting documentation below. Holding recommendations discussed (if applicable).       Prep for procedure:     Procedure prep instructions reviewed.        Any additional information needed:  N/A      Patient  verbalized understanding and had no questions or concerns at this time.      Anh Ponce RN  Endoscopy Procedure Pre Assessment RN  [phone] option 4

## 2024-01-26 NOTE — TELEPHONE ENCOUNTER
Attempted to contact patient in order to complete pre assessment questions.     No answer. Left message to return call to [phone] option 4    Missed call communication sent via Kobojo.      Procedure details:    Patient scheduled for Colonoscopy  on 2/5/24.     Arrival time: 0915. Procedure time 1000    Pre op exam needed? N/A    Facility location: [facility]; [address], [place] [address]    Sedation type: Conscious sedation     Indication for procedure: Colon Screening      Chart review:     Electronic implanted devices? No    Recent diagnosis of diverticulitis within the last 6 weeks? No    Diabetic? No      Medication review:    Anticoagulants? No    NSAIDS? Yes.  Ibuprofen (Advil, Motrin).  Holding interval of 1 day before procedure.    Other medication HOLDING recommendations:  N/A      Prep for procedure:     Bowel prep recommendation: Standard Miralax  Due to: standard bowel prep.    Prep instructions sent via Kobojo.   Corinne Kliber, RN  Endoscopy Procedure Pre Assessment RN

## 2024-02-05 ENCOUNTER — HOSPITAL ENCOUNTER (OUTPATIENT)
Facility: AMBULATORY SURGERY CENTER | Age: 54
Discharge: HOME | End: 2024-02-05
Attending: STUDENT IN AN ORGANIZED HEALTH CARE EDUCATION/TRAINING PROGRAM | Admitting: STUDENT IN AN ORGANIZED HEALTH CARE EDUCATION/TRAINING PROGRAM
Payer: COMMERCIAL

## 2024-02-05 VITALS
OXYGEN SATURATION: 99 % | TEMPERATURE: 97.9 F | SYSTOLIC BLOOD PRESSURE: 126 MMHG | HEART RATE: 73 BPM | RESPIRATION RATE: 16 BRPM | DIASTOLIC BLOOD PRESSURE: 73 MMHG

## 2024-02-05 LAB — COLONOSCOPY: NORMAL

## 2024-02-05 PROCEDURE — G8918 PT W/O PREOP ORDER IV AB PRO: HCPCS

## 2024-02-05 PROCEDURE — 45385 COLONOSCOPY W/LESION REMOVAL: CPT | Mod: PT

## 2024-02-05 PROCEDURE — G8907 PT DOC NO EVENTS ON DISCHARG: HCPCS

## 2024-02-05 PROCEDURE — 88305 TISSUE EXAM BY PATHOLOGIST: CPT | Performed by: PATHOLOGY

## 2024-02-05 PROCEDURE — 45380 COLONOSCOPY AND BIOPSY: CPT | Mod: PT,XS

## 2024-02-05 RX ORDER — LIDOCAINE 40 MG/G
CREAM TOPICAL
Status: DISCONTINUED | OUTPATIENT
Start: 2024-02-05 | End: 2024-02-06 | Stop reason: HOSPADM

## 2024-02-05 RX ORDER — NALOXONE HYDROCHLORIDE 0.4 MG/ML
0.4 INJECTION, SOLUTION INTRAMUSCULAR; INTRAVENOUS; SUBCUTANEOUS
Status: DISCONTINUED | OUTPATIENT
Start: 2024-02-05 | End: 2024-02-06 | Stop reason: HOSPADM

## 2024-02-05 RX ORDER — ATROPINE SULFATE 0.1 MG/ML
1 INJECTION INTRAVENOUS
Status: DISCONTINUED | OUTPATIENT
Start: 2024-02-05 | End: 2024-02-06 | Stop reason: HOSPADM

## 2024-02-05 RX ORDER — EPINEPHRINE 1 MG/ML
0.1 INJECTION, SOLUTION INTRAMUSCULAR; SUBCUTANEOUS
Status: DISCONTINUED | OUTPATIENT
Start: 2024-02-05 | End: 2024-02-06 | Stop reason: HOSPADM

## 2024-02-05 RX ORDER — DIPHENHYDRAMINE HYDROCHLORIDE 50 MG/ML
25-50 INJECTION INTRAMUSCULAR; INTRAVENOUS
Status: DISCONTINUED | OUTPATIENT
Start: 2024-02-05 | End: 2024-02-06 | Stop reason: HOSPADM

## 2024-02-05 RX ORDER — ONDANSETRON 2 MG/ML
4 INJECTION INTRAMUSCULAR; INTRAVENOUS ONCE
Status: DISCONTINUED | OUTPATIENT
Start: 2024-02-05 | End: 2024-02-06 | Stop reason: HOSPADM

## 2024-02-05 RX ORDER — FENTANYL CITRATE 50 UG/ML
50-100 INJECTION, SOLUTION INTRAMUSCULAR; INTRAVENOUS EVERY 5 MIN PRN
Status: DISCONTINUED | OUTPATIENT
Start: 2024-02-05 | End: 2024-02-06 | Stop reason: HOSPADM

## 2024-02-05 RX ORDER — NALOXONE HYDROCHLORIDE 0.4 MG/ML
0.2 INJECTION, SOLUTION INTRAMUSCULAR; INTRAVENOUS; SUBCUTANEOUS
Status: DISCONTINUED | OUTPATIENT
Start: 2024-02-05 | End: 2024-02-06 | Stop reason: HOSPADM

## 2024-02-05 RX ORDER — FLUMAZENIL 0.1 MG/ML
0.2 INJECTION, SOLUTION INTRAVENOUS
Status: DISCONTINUED | OUTPATIENT
Start: 2024-02-05 | End: 2024-02-06 | Stop reason: HOSPADM

## 2024-02-05 RX ORDER — SIMETHICONE 40MG/0.6ML
133 SUSPENSION, DROPS(FINAL DOSAGE FORM)(ML) ORAL
Status: DISCONTINUED | OUTPATIENT
Start: 2024-02-05 | End: 2024-02-06 | Stop reason: HOSPADM

## 2024-02-05 RX ORDER — FLUMAZENIL 0.1 MG/ML
0.2 INJECTION, SOLUTION INTRAVENOUS
Status: CANCELLED | OUTPATIENT
Start: 2024-02-05 | End: 2024-02-05

## 2024-02-05 NOTE — H&P
Pre-Endoscopy History and Physical     Radha Bonilla MRN# [id number]   YOB: 1970 Age: 53 year old     Date of Procedure: 02/05/24  Primary care provider: Maria Luisa Louie  Type of Endoscopy: Colonoscopy  Reason for Procedure: Positive cologuard  Type of Anesthesia Anticipated: Moderate Sedation    HPI:    Radha is a 53 year old female who will be undergoing the above procedure.      Prior colonoscopy: none    A history and physical has been performed, notable for none. The patient's medications and allergies have been reviewed. The risks and benefits of the procedure and the sedation options and risks were discussed with the patient.  All questions were answered and informed consent was obtained.      She denies a personal or family history of anesthesia complications or bleeding disorders. Denies family history of CRC or IBD.    Allergies   Allergen Reactions    No Known Allergies       Allergy to Latex: NO   Allergy to tape: NO   Intolerances: NO     ibuprofen (ADVIL/MOTRIN) 200 MG tablet, Take 600 mg by mouth 2 times daily as needed for mild pain  MAGNESIUM PO, Take 1 tablet by mouth every morning strength unknown  Omega-3 Fatty Acids (FISH OIL OMEGA-3 PO), Take 1 capsule by mouth every morning strength unknown  Vitamin D3 (CHOLECALCIFEROL) 25 mcg (1000 units) tablet, Take 25 mcg by mouth daily    No current facility-administered medications on file prior to encounter.      Patient Active Problem List   Diagnosis    CARDIOVASCULAR SCREENING; LDL GOAL LESS THAN 160    S/P hysterectomy    Post-operative pain    Screening for human papillomavirus    Skin lesion    Family history of skin cancer    Cherry angioma    Skin cancer screening        Past Medical History:   Diagnosis Date    Gastro-oesophageal reflux disease     Menorrhagia         Past Surgical History:   Procedure Laterality Date    CERVIX REMOVAL  01/2020    HPV related, burst stitches soon afterwards, restitched     "DILATION AND CURETTAGE      DILATION AND CURETTAGE N/A 2020    Procedure: EXAM UNDER ANESTHESIA  REPAIR OF VAGINAL CUFF;  Surgeon: Haritha Metz MD;  Location:  OR    LAPAROSCOPIC HYSTERECTOMY SUPRACERVICAL  2013    Procedure: LAPAROSCOPIC HYSTERECTOMY SUPRACERVICAL;  LAPAROSCOPIC SUPRACERVICAL HYSTERECTOMY,BILATERAL SALPINGECTOMY;  Surgeon: Olivier Plasencia MD;  Location:  OR       Social History     Tobacco Use    Smoking status: Never    Smokeless tobacco: Never   Substance Use Topics    Alcohol use: Yes     Comment: SOCIALLY       Family History   Problem Relation Age of Onset    Depression Mother     Anxiety Disorder Mother     Diabetes Father 75        oral meds?    Hypertension Father     Hyperlipidemia Father     Skin Cancer Father     Skin Cancer Sister     Coronary Artery Disease Maternal Grandmother         multiple MIs, first in 70s,  at 98    Hyperlipidemia Maternal Grandmother         strict diet after dx/MI    Skin Cancer Maternal Grandmother     Other Cancer Maternal Grandfather     Diabetes Paternal Grandmother          at 81, declined foot amputation    Skin Cancer Paternal Grandmother        REVIEW OF SYSTEMS:     5 point ROS negative except as noted above in HPI, including Gen., Resp., CV, GI &  system review.      PHYSICAL EXAM:   /84   Temp 97.9  F (36.6  C) (Temporal)   Resp 16   LMP 2013   SpO2 98%  Estimated body mass index is 32.5 kg/m  as calculated from the following:    Height as of 8/10/21: 1.485 m (4' 10.47\").    Weight as of 8/10/21: 71.7 kg (158 lb).   All Vitals have been reviewed.    GENERAL APPEARANCE: healthy and alert  MENTAL STATUS: alert  AIRWAY EXAM: Mallampatti Class II (visualization of the soft palate, fauces, and uvula)  RESP: lungs clear to auscultation - no rales, rhonchi or wheezes  CV: regular rates and rhythm      IMPRESSION   ASA Class 1 - Healthy patient, no medical problems        PLAN:     Plan for colonoscopy. We " discussed the risks, benefits and alternatives and the patient wished to proceed.    The above has been forwarded to the consulting provider.      YOKO ANDERSON MD  [facility]  Phone: [phone]  Fax: [phone]  February 5, 2024

## 2024-02-07 LAB
PATH REPORT.COMMENTS IMP SPEC: NORMAL
PATH REPORT.COMMENTS IMP SPEC: NORMAL
PATH REPORT.FINAL DX SPEC: NORMAL
PATH REPORT.GROSS SPEC: NORMAL
PATH REPORT.MICROSCOPIC SPEC OTHER STN: NORMAL
PATH REPORT.RELEVANT HX SPEC: NORMAL
PHOTO IMAGE: NORMAL

## 2024-02-12 SDOH — HEALTH STABILITY: PHYSICAL HEALTH: ON AVERAGE, HOW MANY MINUTES DO YOU ENGAGE IN EXERCISE AT THIS LEVEL?: 20 MIN

## 2024-02-12 SDOH — HEALTH STABILITY: PHYSICAL HEALTH: ON AVERAGE, HOW MANY DAYS PER WEEK DO YOU ENGAGE IN MODERATE TO STRENUOUS EXERCISE (LIKE A BRISK WALK)?: 3 DAYS

## 2024-02-12 ASSESSMENT — SOCIAL DETERMINANTS OF HEALTH (SDOH): HOW OFTEN DO YOU GET TOGETHER WITH FRIENDS OR RELATIVES?: MORE THAN THREE TIMES A WEEK

## 2024-02-16 ENCOUNTER — OFFICE VISIT (OUTPATIENT)
Dept: FAMILY MEDICINE | Facility: CLINIC | Age: 54
End: 2024-02-16
Payer: COMMERCIAL

## 2024-02-16 VITALS
WEIGHT: 157.3 LBS | SYSTOLIC BLOOD PRESSURE: 136 MMHG | BODY MASS INDEX: 31.71 KG/M2 | OXYGEN SATURATION: 98 % | DIASTOLIC BLOOD PRESSURE: 82 MMHG | TEMPERATURE: 97 F | HEART RATE: 80 BPM | RESPIRATION RATE: 16 BRPM | HEIGHT: 59 IN

## 2024-02-16 DIAGNOSIS — Z83.3 FAMILY HISTORY OF DIABETES MELLITUS: ICD-10-CM

## 2024-02-16 DIAGNOSIS — Z00.00 ROUTINE GENERAL MEDICAL EXAMINATION AT A HEALTH CARE FACILITY: Primary | ICD-10-CM

## 2024-02-16 DIAGNOSIS — R53.83 OTHER FATIGUE: ICD-10-CM

## 2024-02-16 DIAGNOSIS — Z11.51 SCREENING FOR HUMAN PAPILLOMAVIRUS: ICD-10-CM

## 2024-02-16 DIAGNOSIS — Z12.31 VISIT FOR SCREENING MAMMOGRAM: Chronic | ICD-10-CM

## 2024-02-16 DIAGNOSIS — N89.8 VAGINAL DRYNESS: ICD-10-CM

## 2024-02-16 DIAGNOSIS — R35.0 URINARY FREQUENCY: ICD-10-CM

## 2024-02-16 DIAGNOSIS — R40.0 DAYTIME SLEEPINESS: ICD-10-CM

## 2024-02-16 LAB
ALBUMIN SERPL BCG-MCNC: 4.5 G/DL (ref 3.5–5.2)
ALP SERPL-CCNC: 75 U/L (ref 40–150)
ALT SERPL W P-5'-P-CCNC: 61 U/L (ref 0–50)
ANION GAP SERPL CALCULATED.3IONS-SCNC: 12 MMOL/L (ref 7–15)
AST SERPL W P-5'-P-CCNC: 39 U/L (ref 0–45)
BILIRUB SERPL-MCNC: 1.1 MG/DL
BUN SERPL-MCNC: 14.3 MG/DL (ref 6–20)
CALCIUM SERPL-MCNC: 9.7 MG/DL (ref 8.6–10)
CHLORIDE SERPL-SCNC: 103 MMOL/L (ref 98–107)
CHOLEST SERPL-MCNC: 234 MG/DL
CREAT SERPL-MCNC: 0.84 MG/DL (ref 0.51–0.95)
DEPRECATED HCO3 PLAS-SCNC: 25 MMOL/L (ref 22–29)
EGFRCR SERPLBLD CKD-EPI 2021: 82 ML/MIN/1.73M2
ERYTHROCYTE [DISTWIDTH] IN BLOOD BY AUTOMATED COUNT: 13.5 % (ref 10–15)
FASTING STATUS PATIENT QL REPORTED: YES
FERRITIN SERPL-MCNC: 121 NG/ML (ref 11–328)
GLUCOSE SERPL-MCNC: 94 MG/DL (ref 70–99)
HBA1C MFR BLD: 5.3 % (ref 0–5.6)
HCT VFR BLD AUTO: 47 % (ref 35–47)
HDLC SERPL-MCNC: 66 MG/DL
HGB BLD-MCNC: 15 G/DL (ref 11.7–15.7)
LDLC SERPL CALC-MCNC: 141 MG/DL
MCH RBC QN AUTO: 29.6 PG (ref 26.5–33)
MCHC RBC AUTO-ENTMCNC: 31.9 G/DL (ref 31.5–36.5)
MCV RBC AUTO: 93 FL (ref 78–100)
NONHDLC SERPL-MCNC: 168 MG/DL
PLATELET # BLD AUTO: 550 10E3/UL (ref 150–450)
POTASSIUM SERPL-SCNC: 3.8 MMOL/L (ref 3.4–5.3)
PROT SERPL-MCNC: 7.2 G/DL (ref 6.4–8.3)
RBC # BLD AUTO: 5.07 10E6/UL (ref 3.8–5.2)
SODIUM SERPL-SCNC: 140 MMOL/L (ref 135–145)
TRIGL SERPL-MCNC: 135 MG/DL
TSH SERPL DL<=0.005 MIU/L-ACNC: 1.28 UIU/ML (ref 0.3–4.2)
VIT B12 SERPL-MCNC: 904 PG/ML (ref 232–1245)
VIT D+METAB SERPL-MCNC: 20 NG/ML (ref 20–50)
WBC # BLD AUTO: 8.5 10E3/UL (ref 4–11)

## 2024-02-16 PROCEDURE — 99214 OFFICE O/P EST MOD 30 MIN: CPT | Mod: 25 | Performed by: FAMILY MEDICINE

## 2024-02-16 PROCEDURE — 36415 COLL VENOUS BLD VENIPUNCTURE: CPT | Performed by: FAMILY MEDICINE

## 2024-02-16 PROCEDURE — 82607 VITAMIN B-12: CPT | Performed by: FAMILY MEDICINE

## 2024-02-16 PROCEDURE — 80053 COMPREHEN METABOLIC PANEL: CPT | Performed by: FAMILY MEDICINE

## 2024-02-16 PROCEDURE — 83036 HEMOGLOBIN GLYCOSYLATED A1C: CPT | Performed by: FAMILY MEDICINE

## 2024-02-16 PROCEDURE — 84443 ASSAY THYROID STIM HORMONE: CPT | Performed by: FAMILY MEDICINE

## 2024-02-16 PROCEDURE — 82306 VITAMIN D 25 HYDROXY: CPT | Performed by: FAMILY MEDICINE

## 2024-02-16 PROCEDURE — 82728 ASSAY OF FERRITIN: CPT | Performed by: FAMILY MEDICINE

## 2024-02-16 PROCEDURE — 99396 PREV VISIT EST AGE 40-64: CPT | Performed by: FAMILY MEDICINE

## 2024-02-16 PROCEDURE — 80061 LIPID PANEL: CPT | Performed by: FAMILY MEDICINE

## 2024-02-16 PROCEDURE — 85027 COMPLETE CBC AUTOMATED: CPT | Performed by: FAMILY MEDICINE

## 2024-02-16 RX ORDER — ESTRADIOL 10 UG/1
10 INSERT VAGINAL
Qty: 24 TABLET | Refills: 3 | Status: SHIPPED | OUTPATIENT
Start: 2024-02-19

## 2024-02-16 ASSESSMENT — ANXIETY QUESTIONNAIRES
7. FEELING AFRAID AS IF SOMETHING AWFUL MIGHT HAPPEN: NOT AT ALL
5. BEING SO RESTLESS THAT IT IS HARD TO SIT STILL: NOT AT ALL
4. TROUBLE RELAXING: NOT AT ALL
8. IF YOU CHECKED OFF ANY PROBLEMS, HOW DIFFICULT HAVE THESE MADE IT FOR YOU TO DO YOUR WORK, TAKE CARE OF THINGS AT HOME, OR GET ALONG WITH OTHER PEOPLE?: NOT DIFFICULT AT ALL
GAD7 TOTAL SCORE: 0
GAD7 TOTAL SCORE: 0
1. FEELING NERVOUS, ANXIOUS, OR ON EDGE: NOT AT ALL
7. FEELING AFRAID AS IF SOMETHING AWFUL MIGHT HAPPEN: NOT AT ALL
2. NOT BEING ABLE TO STOP OR CONTROL WORRYING: NOT AT ALL
IF YOU CHECKED OFF ANY PROBLEMS ON THIS QUESTIONNAIRE, HOW DIFFICULT HAVE THESE PROBLEMS MADE IT FOR YOU TO DO YOUR WORK, TAKE CARE OF THINGS AT HOME, OR GET ALONG WITH OTHER PEOPLE: NOT DIFFICULT AT ALL
3. WORRYING TOO MUCH ABOUT DIFFERENT THINGS: NOT AT ALL
GAD7 TOTAL SCORE: 0
6. BECOMING EASILY ANNOYED OR IRRITABLE: NOT AT ALL

## 2024-02-16 ASSESSMENT — PATIENT HEALTH QUESTIONNAIRE - PHQ9
10. IF YOU CHECKED OFF ANY PROBLEMS, HOW DIFFICULT HAVE THESE PROBLEMS MADE IT FOR YOU TO DO YOUR WORK, TAKE CARE OF THINGS AT HOME, OR GET ALONG WITH OTHER PEOPLE: NOT DIFFICULT AT ALL
SUM OF ALL RESPONSES TO PHQ QUESTIONS 1-9: 6
SUM OF ALL RESPONSES TO PHQ QUESTIONS 1-9: 6

## 2024-02-16 ASSESSMENT — PAIN SCALES - GENERAL: PAINLEVEL: NO PAIN (0)

## 2024-02-16 NOTE — PROGRESS NOTES
Preventive Care Visit  [facility]  Maria Luisa Louie MD, Family Medicine  Feb 16, 2024    Assessment & Plan       ICD-10-CM    1. Routine general medical examination at a health care facility   Overall healthy, plan to do routine screening labs. Lipids have been elevated in the past. Family history of diabetes in mother.   H/o SENA 2/3 on pap, s/p hysterectomy in '13, cervix removal in 1/20. Complications afterwards, rec follow-up w/ ob/gyn for now, needs continued vaginal paps.  Pt promises she will follow-up with ob/gyn (otherwise need to do pap here). Z00.00 *MA Screening Digital Bilateral     Lipid panel reflex to direct LDL Fasting     Comprehensive metabolic panel (BMP + Alb, Alk Phos, ALT, AST, Total. Bili, TP)     Hemoglobin A1c      2. Daytime sleepiness   Reports ongoing fatigue/sleepiness over the past few weeks to months. Plan to evaluate further for anemia, thyroid abnormalities, or vitamin deficiency. If lab work is negative will plan for referral to sleep medicines.  R40.0 CBC with platelets     Ferritin     TSH with free T4 reflex     Vitamin D Deficiency     Vitamin B12     Adult Sleep Eval & Management  Referral      3. Other fatigue  R53.83 CBC with platelets     Ferritin     TSH with free T4 reflex     Vitamin D Deficiency     Vitamin B12     Adult Sleep Eval & Management  Referral      4. Vaginal dryness   Reports history of vaginal dryness, notably with sexual activity. Previously used estrogen suppositories and found this to be helpful. Plan for estrogen tablets through [employer] [facility] mail order as not likely covered through her plan. N89.8 estradiol (VAGIFEM) 10 MCG TABS vaginal tablet      5. Urinary frequency   Reports history of urinary urgency. Previously had pelvic floor reconstruction in 2020 and completed pelvic floor therapy at that time. Plan for estrogen tablets R35.0 estradiol (VAGIFEM) 10 MCG TABS vaginal tablet      6. Family history of  "diabetes mellitus   Family history of diabetes in her mother. No previous A1c.  Z83.3 Hemoglobin A1c      7. Visit for screening mammogram   Last mammogram in 2016, normal. No family history of breast cancer. Recommended repeat screening mammogram for preventative care.  Z12.31 MA SCREENING DIGITAL BILAT - Future  (s+30)          Follow-up Visit   Expected date:  Feb 16, 2025 (Approximate)      Follow Up Appointment Details:     Follow-up with whom?: PCP    Follow-Up for what?: Adult Preventive    How?: In Person                       BMI  Estimated body mass index is 31.92 kg/m  as calculated from the following:    Height as of this encounter: 1.495 m (4' 10.86\").    Weight as of this encounter: 71.4 kg (157 lb 4.8 oz).   Weight management plan: Discussed healthy diet and exercise guidelines    Counseling  Appropriate preventive services were discussed with this patient, including applicable screening as appropriate for fall prevention, nutrition, physical activity, Tobacco-use cessation, weight loss and cognition.  Checklist reviewing preventive services available has been given to the patient.  Reviewed patient's diet, addressing concerns and/or questions.   She is at risk for lack of exercise and has been provided with information to increase physical activity for the benefit of her well-being.   The patient was instructed to see the dentist every 6 months.   She is at risk for psychosocial distress and has been provided with information to reduce risk.   The patient's PHQ-9 score is consistent with mild depression. She was provided with information regarding depression.     Patient has been advised of split billing requirements and indicates understanding: Yes              David Garcia is a 53 year old, presenting for the following:  Physical (Pt is fasting) and Fatigue        2/16/2024     8:44 AM   Additional Questions   Roomed by Mckenzie ASHLEY   Accompanied by n/a        Health Care Directive  Patient " does not have a Health Care Directive or Living Will: Discussed advance care planning with patient; information given to patient to review.    HPI  Fatigue  Onset: 2 month(s) ago   How long have you felt fatigued: 2 month(s) ago                                                   Description:  Description of activities and lifestyle: Works as  [employer]: 4 or 5 class for day.   Schedule and responsibilities: Work full  How much sleep are you gettin hours at a time  Daily exercise: moderate regular exercise program  Are there episodes of normal energy levels: Some  Accompanying Signs & Symptoms:  Falling asleep during the day: YES  Snoring: No  Do you stop breathing while sleeping: No                 Night sweats: No  Chest Pain: No  History of Alcohol/drug abuse:No  History of Depression: No  Any new anxiety/stressors:No  Abdominal pain: No   Change in appetite: No                 Weight gain/loss: Not sure    Dark or bloody stools: No  Therapies tried and outcome: None with no relief      Fatigue - she has noted increasing over past few weeks to months. She goes to bed early and has restful sleep, but then will nap 1-2 times in the afternoon. No lightheadedness or dizziness. No recent weight changes. She does not have a restrictive diet. No noted blood in her stool.     Ringing in her ears - states this has always been present and may be impacting her hearing. Has not had an audiology assessment since elementary school.     Vaginal dryness - has been going on for multiple years. Notices it most when she is sexually active. Previously used estrogen suppository following her gyn surgeries in  and this was noted to help. Also has urinary urgency, but no incontinence. She does not feel like this is impacting daily life.     She had a colonoscopy 2024 following a positive cologuard test. Colonoscopy pathology showed no high grade dysplasia or malignancy, recommended follow up in three years.     She  has a history of hysterectomy in 2013 for heavy uterine bleeding. She had her cervix removed in 2020, pathology showed SENA 2-3. She has not had Pap smears since and is planning to follow up with Ob/Gyn. She also had pelvic floor reconstruction in 2020 and did pelvic floor PT following.     Her last mammogram was in 2016, this was normal. She is interested in repeating this year. She has no family history of breast cancer. She is interested in the COVID and shingles vaccines, and plans to get them when her schedule allows. She is not interested in the flu shot.     She is not taking any medications or supplements currently. She continues to enjoy teaching yoga.             2/12/2024   General Health   How would you rate your overall physical health? Excellent   Feel stress (tense, anxious, or unable to sleep) Only a little   (!) STRESS CONCERN      2/12/2024   Nutrition   Three or more servings of calcium each day? Yes   Diet: Regular (no restrictions)   How many servings of fruit and vegetables per day? (!) 2-3   How many sweetened beverages each day? 0-1         2/12/2024   Exercise   Days per week of moderate/strenous exercise 3 days   Average minutes spent exercising at this level 20 min         2/12/2024   Social Factors   Frequency of gathering with friends or relatives More than three times a week   Worry food won't last until get money to buy more No   Food not last or not have enough money for food? No   Do you have housing?  Yes   Are you worried about losing your housing? No   Lack of transportation? No   Unable to get utilities (heat,electricity)? No         2/12/2024   Fall Risk   Fallen 2 or more times in the past year? No   Trouble with walking or balance? No          2/12/2024   Dental   Dentist two times every year? (!) NO         2/12/2024   TB Screening   Were you born outside of [place]?  No       Today's PHQ-9 Score:       2/16/2024     8:42 AM   PHQ-9 SCORE   PHQ-9 Total Score MyChart 6 (Mild  depression)   PHQ-9 Total Score 6         2/12/2024   Substance Use   Alcohol more than 3/day or more than 7/wk No   Do you use any other substances recreationally? (!) CANNABIS PRODUCTS     Social History     Tobacco Use    Smoking status: Never    Smokeless tobacco: Never   Substance Use Topics    Alcohol use: Yes     Comment: SOCIALLY    Drug use: No             2/12/2024   Breast Cancer Screening   Family history of breast, colon, or ovarian cancer? No / Unknown      Mammogram Screening - Mammogram every 1-2 years updated in Health Maintenance based on mutual decision making        2/12/2024   STI Screening   New sexual partner(s) since last STI/HIV test? No     History of abnormal Pap smear: YES - SENA 2/3 on biopsy - PAP/HPV co-testing at 12, 24 months.  If two negative results repeat co-testing in 3 years, if negative then routine screening.    2013 - took out uterus for severe bleeding.  2020- she had HPV, took her cervix out. SENA 2/3 on 1/6/24, cervix removed surgically 1/24/24- unable to see pathology report from that time.  5/21- report of pap done, but nothing in records.  Went back to Dr. Plasencia a few times - very painful taking stitches out.  Hasn't seen that group since 2020.        11/6/2019     3:13 PM   PAP / HPV   PAP-ABSTRACT See Scanned Document           This result is from an external source.     The 10-year ASCVD risk score (Chadwick FREGOSO, et al., 2019) is: 2.3%    Values used to calculate the score:      Age: 53 years      Sex: Female      Is Non- : No      Diabetic: No      Tobacco smoker: No      Systolic Blood Pressure: 136 mmHg      Is BP treated: No      HDL Cholesterol: 72 mg/dL      Total Cholesterol: 267 mg/dL           Reviewed and updated as needed this visit by Provider   Tobacco  Allergies  Meds  Problems  Med Hx  Surg Hx  Fam Hx            Past Medical History:   Diagnosis Date    Gastro-oesophageal reflux disease     Menorrhagia      Past Surgical  "History:   Procedure Laterality Date    CERVIX REMOVAL  01/2020    HPV related, burst stitches soon afterwards, restitched    COLONOSCOPY N/A 2/5/2024    Procedure: COLONOSCOPY, FLEXIBLE, WITH LESION REMOVAL USING SNARE;  Surgeon: Kalli Castro MD;  Location: [facility] OR    COLONOSCOPY N/A 2/5/2024    Procedure: COLONOSCOPY, WITH POLYPECTOMY AND BIOPSY;  Surgeon: Kalli Castro MD;  Location: [facility] OR    COLONOSCOPY WITH CO2 INSUFFLATION N/A 2/5/2024    Procedure: Colonoscopy with CO2 insufflation;  Surgeon: Kalli Castro MD;  Location: [facility] OR    DILATION AND CURETTAGE      DILATION AND CURETTAGE N/A 01/24/2020    Procedure: EXAM UNDER ANESTHESIA  REPAIR OF VAGINAL CUFF;  Surgeon: Haritha Metz MD;  Location:  OR    LAPAROSCOPIC HYSTERECTOMY SUPRACERVICAL  11/07/2013    Procedure: LAPAROSCOPIC HYSTERECTOMY SUPRACERVICAL;  LAPAROSCOPIC SUPRACERVICAL HYSTERECTOMY,BILATERAL SALPINGECTOMY;  Surgeon: Olivier Plasencia MD;  Location:  OR     Lab work is in process  Labs reviewed in EPIC      Review of Systems  Constitutional, HEENT, cardiovascular, pulmonary, GI, , musculoskeletal, neuro, skin, endocrine and psych systems are negative, except as otherwise noted.     Objective    Exam  /82 (BP Location: Right arm, Patient Position: Sitting, Cuff Size: Adult Regular)   Pulse 80   Temp 97  F (36.1  C) (Temporal)   Resp 16   Ht 1.495 m (4' 10.86\")   Wt 71.4 kg (157 lb 4.8 oz)   LMP 09/07/2013   SpO2 98%   BMI 31.92 kg/m     Estimated body mass index is 31.92 kg/m  as calculated from the following:    Height as of this encounter: 1.495 m (4' 10.86\").    Weight as of this encounter: 71.4 kg (157 lb 4.8 oz).    Physical Exam  GENERAL: alert and no distress  EYES: Eyes grossly normal to inspection, PERRL and conjunctivae and sclerae normal, EOM intact  HENT: ear canals and TM's normal, nose and mouth without ulcers or lesions  NECK: no adenopathy, no asymmetry, masses, or scars  RESP: lungs " clear to auscultation - no rales, rhonchi or wheezes  BREAST: normal without masses, tenderness or nipple discharge and no palpable axillary masses or adenopathy  CV: regular rate and rhythm, normal S1 S2, no S3 or S4, no murmur, click or rub, no peripheral edema  ABDOMEN: soft, nontender, no hepatosplenomegaly, no masses and bowel sounds normal  MS: no gross musculoskeletal defects noted, no edema  SKIN: no suspicious lesions or rashes  NEURO: Normal strength and tone, mentation intact and speech normal  PSYCH: mentation appears normal, affect normal/bright        The medical student has acted as my scribe.  I have completed all components of the history, physical exam and assessment and plan and agree with the note as documented.   Belia Jessica - Medical Student, [facility]     Signed Electronically by: Maria Luisa Louie MD      Answers submitted by the patient for this visit:  Patient Health Questionnaire (Submitted on 2/16/2024)  If you checked off any problems, how difficult have these problems made it for you to do your work, take care of things at home, or get along with other people?: Not difficult at all  PHQ9 TOTAL SCORE: 6  CRISTINA-7 (Submitted on 2/16/2024)  CRISTINA 7 TOTAL SCORE: 0

## 2024-02-16 NOTE — PATIENT INSTRUCTIONS
Preventive Care Advice   This is general advice given by our system to help you stay healthy. However, your care team may have specific advice just for you. Please talk to your care team about your preventive care needs.  Nutrition  Eat 5 or more servings of fruits and vegetables each day.  Try wheat bread, brown rice and whole grain pasta (instead of white bread, rice, and pasta).  Get enough calcium and vitamin D. Check the label on foods and aim for 100% of the RDA (recommended daily allowance).  Lifestyle  Exercise at least 150 minutes each week  (30 minutes a day, 5 days a week).  Do muscle strengthening activities 2 days a week. These help control your weight and prevent disease.  No smoking.  Wear sunscreen to prevent skin cancer.  Have a dental exam and cleaning every 6 months.  Yearly exams  See your health care team every year to talk about:  Any changes in your health.  Any medicines your care team has prescribed.  Preventive care, family planning, and ways to prevent chronic diseases.  Shots (vaccines)   HPV shots (up to age 26), if you've never had them before.  Hepatitis B shots (up to age 59), if you've never had them before.  COVID-19 shot: Get this shot when it's due.  Flu shot: Get a flu shot every year.  Tetanus shot: Get a tetanus shot every 10 years.  Pneumococcal, hepatitis A, and RSV shots: Ask your care team if you need these based on your risk.  Shingles shot (for age 50 and up)  General health tests  Diabetes screening:  Starting at age 35, Get screened for diabetes at least every 3 years.  If you are younger than age 35, ask your care team if you should be screened for diabetes.  Cholesterol test: At age 39, start having a cholesterol test every 5 years, or more often if advised.  Bone density scan (DEXA): At age 50, ask your care team if you should have this scan for osteoporosis (brittle bones).  Hepatitis C: Get tested at least once in your life.  STIs (sexually transmitted  infections)  Before age 24: Ask your care team if you should be screened for STIs.  After age 24: Get screened for STIs if you're at risk. You are at risk for STIs (including HIV) if:  You are sexually active with more than one person.  You don't use condoms every time.  You or a partner was diagnosed with a sexually transmitted infection.  If you are at risk for HIV, ask about PrEP medicine to prevent HIV.  Get tested for HIV at least once in your life, whether you are at risk for HIV or not.  Cancer screening tests  Cervical cancer screening: If you have a cervix, begin getting regular cervical cancer screening tests starting at age 21.  Breast cancer scan (mammogram): If you've ever had breasts, begin having regular mammograms starting at age 40. This is a scan to check for breast cancer.  Colon cancer screening: It is important to start screening for colon cancer at age 45.  Have a colonoscopy test every 10 years (or more often if you're at risk) Or, ask your provider about stool tests like a FIT test every year or Cologuard test every 3 years.  To learn more about your testing options, visit:   https://www.[employer]/073881.pdf.  For help making a decision, visit:   https://bit.ly/vw65870.  Prostate cancer screening test: If you have a prostate, ask your care team if a prostate cancer screening test (PSA) at age 55 is right for you.  Lung cancer screening: If you are a current or former smoker ages 50 to 80, ask your care team if ongoing lung cancer screenings are right for you.  For informational purposes only. Not to replace the advice of your health care provider. Copyright   2023 [facility]. All rights reserved. Clinically reviewed by the [facility]. [employer] 199340 - REV 01/24.    Learning About Stress  What is stress?     Stress is your body's response to a hard situation. Your body can have a physical, emotional, or mental response. Stress is a fact of life for  most people, and it affects everyone differently. What causes stress for you may not be stressful for someone else.  A lot of things can cause stress. You may feel stress when you go on a job interview, take a test, or run a race. This kind of short-term stress is normal and even useful. It can help you if you need to work hard or react quickly. For example, stress can help you finish an important job on time.  Long-term stress is caused by ongoing stressful situations or events. Examples of long-term stress include long-term health problems, ongoing problems at work, or conflicts in your family. Long-term stress can harm your health.  How does stress affect your health?  When you are stressed, your body responds as though you are in danger. It makes hormones that speed up your heart, make you breathe faster, and give you a burst of energy. This is called the fight-or-flight stress response. If the stress is over quickly, your body goes back to normal and no harm is done.  But if stress happens too often or lasts too long, it can have bad effects. Long-term stress can make you more likely to get sick, and it can make symptoms of some diseases worse. If you tense up when you are stressed, you may develop neck, shoulder, or low back pain. Stress is linked to high blood pressure and heart disease.  Stress also harms your emotional health. It can make you garrett, tense, or depressed. Your relationships may suffer, and you may not do well at work or school.  What can you do to manage stress?  You can try these things to help manage stress:   Do something active. Exercise or activity can help reduce stress. Walking is a great way to get started. Even everyday activities such as housecleaning or yard work can help.  Try yoga or eliezer chi. These techniques combine exercise and meditation. You may need some training at first to learn them.  Do something you enjoy. For example, listen to music or go to a movie. Practice your  "hobby or do volunteer work.  Meditate. This can help you relax, because you are not worrying about what happened before or what may happen in the future.  Do guided imagery. Imagine yourself in any setting that helps you feel calm. You can use online videos, books, or a teacher to guide you.  Do breathing exercises. For example:  From a standing position, bend forward from the waist with your knees slightly bent. Let your arms dangle close to the floor.  Breathe in slowly and deeply as you return to a standing position. Roll up slowly and lift your head last.  Hold your breath for just a few seconds in the standing position.  Breathe out slowly and bend forward from the waist.  Let your feelings out. Talk, laugh, cry, and express anger when you need to. Talking with supportive friends or family, a counselor, or a eliot leader about your feelings is a healthy way to relieve stress. Avoid discussing your feelings with people who make you feel worse.  Write. It may help to write about things that are bothering you. This helps you find out how much stress you feel and what is causing it. When you know this, you can find better ways to cope.  What can you do to prevent stress?  You might try some of these things to help prevent stress:  Manage your time. This helps you find time to do the things you want and need to do.  Get enough sleep. Your body recovers from the stresses of the day while you are sleeping.  Get support. Your family, friends, and community can make a difference in how you experience stress.  Limit your news feed. Avoid or limit time on social media or news that may make you feel stressed.  Do something active. Exercise or activity can help reduce stress. Walking is a great way to get started.  Where can you learn more?  Go to https://www.healthwise.net/patiented  Enter N032 in the search box to learn more about \"Learning About Stress.\"  Current as of: February 26, 2023               Content Version: " 13.8    6068-9243 [employer].   Care instructions adapted under license by your healthcare professional. If you have questions about a medical condition or this instruction, always ask your healthcare professional. [employer] disclaims any warranty or liability for your use of this information.      Learning About Depression Screening  What is depression screening?  Depression screening is a way to see if you have depression symptoms. It may be done by a doctor or counselor. It's often part of a routine checkup. That's because your mental health is just as important as your physical health.  Depression is a mental health condition that affects how you feel, think, and act. You may:  Have less energy.  Lose interest in your daily activities.  Feel sad and grouchy for a long time.  Depression is very common. It affects people of all ages.  Many things can lead to depression. Some people become depressed after they have a stroke or find out they have a major illness like cancer or heart disease. The death of a loved one or a breakup may lead to depression. It can run in families. Most experts believe that a combination of inherited genes and stressful life events can cause it.  What happens during screening?  You may be asked to fill out a form about your depression symptoms. You and the doctor will discuss your answers. The doctor may ask you more questions to learn more about how you think, act, and feel.  What happens after screening?  If you have symptoms of depression, your doctor will talk to you about your options.  Doctors usually treat depression with medicines or counseling. Often, combining the two works best. Many people don't get help because they think that they'll get over the depression on their own. But people with depression may not get better unless they get treatment.  The cause of depression is not well understood. There may be many factors involved. But if you have  "depression, it's not your fault.  A serious symptom of depression is thinking about death or suicide. If you or someone you care about talks about this or about feeling hopeless, get help right away.  It's important to know that depression can be treated. Medicine, counseling, and self-care may help.  Where can you learn more?  Go to https://www.[employer]utorialTab.net/patiented  Enter T185 in the search box to learn more about \"Learning About Depression Screening.\"  Current as of: June 25, 2023               Content Version: 13.8    2025-3800 [email or web address].   Care instructions adapted under license by your healthcare professional. If you have questions about a medical condition or this instruction, always ask your healthcare professional. [email or web address] disclaims any warranty or liability for your use of this information.      Substance Use Disorder: Care Instructions  Overview     You can improve your life and health by stopping your use of alcohol or drugs. When you don't drink or use drugs, you may feel and sleep better. You may get along better with your family, friends, and coworkers. There are medicines and programs that can help with substance use disorder.  How can you care for yourself at home?  Here are some ways to help you stay sober and prevent relapse.  If you have been given medicine to help keep you sober or reduce your cravings, be sure to take it exactly as prescribed.  Talk to your doctor about programs that can help you stop using drugs or drinking alcohol.  Do not keep alcohol or drugs in your home.  Plan ahead. Think about what you'll say if other people ask you to drink or use drugs. Try not to spend time with people who drink or use drugs.  Use the time and money spent on drinking or drugs to do something that's important to you.  Preventing a relapse  Have a plan to deal with relapse. Learn to recognize changes in your thinking that lead you to drink or use drugs. Get help " before you start to drink or use drugs again.  Try to stay away from situations, friends, or places that may lead you to drink or use drugs.  If you feel the need to drink alcohol or use drugs again, seek help right away. Call a trusted friend or family member. Some people get support from organizations such as Narcotics Anonymous or [employer] or from treatment facilities.  If you relapse, get help as soon as you can. Some people make a plan with another person that outlines what they want that person to do for them if they relapse. The plan usually includes how to handle the relapse and who to notify in case of relapse.  Don't give up. Remember that a relapse doesn't mean that you have failed. Use the experience to learn the triggers that lead you to drink or use drugs. Then quit again. Recovery is a lifelong process. Many people have several relapses before they are able to quit for good.  Follow-up care is a key part of your treatment and safety. Be sure to make and go to all appointments, and call your doctor if you are having problems. It's also a good idea to know your test results and keep a list of the medicines you take.  When should you call for help?   Call 911  anytime you think you may need emergency care. For example, call if you or someone else:    Has overdosed or has withdrawal signs. Be sure to tell the emergency workers that you are or someone else is using or trying to quit using drugs. Overdose or withdrawal signs may include:  Losing consciousness.  Seizure.  Seeing or hearing things that aren't there (hallucinations).     Is thinking or talking about suicide or harming others.   Where to get help 24 hours a day, 7 days a week   If you or someone you know talks about suicide, self-harm, a mental health crisis, a substance use crisis, or any other kind of emotional distress, get help right away. You can:    Call the Suicide and Crisis Lifeline at 368.     Call 2-818-045-AYQO  "(1-926.963.3873).     Text HOME to 181815 to access the Crisis Text Line.   Consider saving these numbers in your phone.  Go to [employer]Go[employer]VAZATA for more information or to chat online.  Call your doctor now or seek immediate medical care if:    You are having withdrawal symptoms. These may include nausea or vomiting, sweating, shakiness, and anxiety.   Watch closely for changes in your health, and be sure to contact your doctor if:    You have a relapse.     You need more help or support to stop.   Where can you learn more?  Go to https://www.[employer].net/patiented  Enter H573 in the search box to learn more about \"Substance Use Disorder: Care Instructions.\"  Current as of: March 21, 2023               Content Version: 13.8    1255-9717 [employer].   Care instructions adapted under license by your healthcare professional. If you have questions about a medical condition or this instruction, always ask your healthcare professional. Healthwise, [employer] disclaims any warranty or liability for your use of this information.      "

## 2024-02-19 NOTE — RESULT ENCOUNTER NOTE
-Your cholesterol panel looks better with a lower LDL (the bad cholesterol, down from 163 to 141 which is a good change!) and a normal HDL (the good cholesterol).   -Your TSH (thyroid stimulating hormone, which is elevated in hypothyroidism, and lowered in hyperthyroidism) is normal.  -Your comprehensive metabolic panel (CMP, which includes electrolyte levels, blood sugar levels, and kidney and liver function tests) looks good other than the just slightly high ALT (a liver function test).  We can check this again next year.  -Your hemoglobin A1C (the three month average of your glucose levels) looks very good at 5.3- no sign of diabetes or pre-diabetes.  -Your CBC labs (which includes blood labs looking for signs of infection or anemia) looks good except for the elevated platelet level - we should check this again at your next appointment as well.  -Your ferritin level is good, which sometimes can give a hint of your iron stores, and your Vitamin B12 is also in a good range.  -Your Vitamin D is on the low end of normal at '20', so I would increase your Vitamin D supplementation (usually up to 100 mcg (4000 international unit(s)) daily is safe).    The Vitamin D supplementation may help the sleepiness a bit, but I am not sure how much, so I would likely still go ahead and schedule the sleep clinic consultation.    Please let me know if you have any questions.  Best,   Malcolm Louie MD

## 2024-03-04 ENCOUNTER — MYC MEDICAL ADVICE (OUTPATIENT)
Dept: FAMILY MEDICINE | Facility: CLINIC | Age: 54
End: 2024-03-04
Payer: COMMERCIAL

## 2024-04-25 ENCOUNTER — OFFICE VISIT (OUTPATIENT)
Dept: FAMILY MEDICINE | Facility: CLINIC | Age: 54
End: 2024-04-25
Payer: COMMERCIAL

## 2024-04-25 VITALS
BODY MASS INDEX: 30.84 KG/M2 | HEART RATE: 75 BPM | HEIGHT: 60 IN | WEIGHT: 157.1 LBS | TEMPERATURE: 97.9 F | SYSTOLIC BLOOD PRESSURE: 118 MMHG | DIASTOLIC BLOOD PRESSURE: 80 MMHG | OXYGEN SATURATION: 99 % | RESPIRATION RATE: 18 BRPM

## 2024-04-25 DIAGNOSIS — L03.115 CELLULITIS OF RIGHT LOWER EXTREMITY: Primary | ICD-10-CM

## 2024-04-25 PROCEDURE — 99213 OFFICE O/P EST LOW 20 MIN: CPT | Performed by: PHYSICIAN ASSISTANT

## 2024-04-25 RX ORDER — SULFAMETHOXAZOLE/TRIMETHOPRIM 800-160 MG
1 TABLET ORAL 2 TIMES DAILY
Qty: 20 TABLET | Refills: 0 | Status: SHIPPED | OUTPATIENT
Start: 2024-04-25 | End: 2024-04-30

## 2024-04-25 RX ORDER — MUPIROCIN 20 MG/G
OINTMENT TOPICAL 3 TIMES DAILY
Qty: 22 G | Refills: 1 | Status: SHIPPED | OUTPATIENT
Start: 2024-04-25

## 2024-04-25 ASSESSMENT — PAIN SCALES - GENERAL: PAINLEVEL: MILD PAIN (3)

## 2024-04-25 NOTE — PROGRESS NOTES
"  Assessment & Plan     Cellulitis of right lower extremity  Prescription for oral antibiotic sent to pharmacy with over the counter and supportive care discussed with patient. May need second round of oral antibiotic pending above; tetanus shot up to date within 5 years. Return to clinic with any worsening or changes in symptoms or go to ER Urgent care in off hours.  - sulfamethoxazole-trimethoprim (BACTRIM DS) 800-160 MG tablet; Take 1 tablet by mouth 2 times daily for 10 days  - mupirocin (BACTROBAN) 2 % external ointment; Apply topically 3 times daily    Review of prior external note(s) from - previous routine notes  20 minutes spent by me on the date of the encounter doing chart review, history and exam, documentation and further activities per the note        See Patient Instructions    David Garcia is a 53 year old, presenting for the following health issues:  Trauma        4/25/2024     6:59 AM   Additional Questions   Roomed by Yara HENRIQUEZ MA apprentice   Accompanied by n/a     History of Present Illness       Reason for visit:  R leg wound (shin) not healting  Symptom onset:  1-2 weeks ago  Symptoms include:  Yellow-leigh puss, tenderness/pain  Symptom intensity:  Moderate  Symptom progression:  Staying the same  Had these symptoms before:  No    She eats 2-3 servings of fruits and vegetables daily.She consumes 1 sweetened beverage(s) daily.She exercises with enough effort to increase her heart rate 20 to 29 minutes per day.  She exercises with enough effort to increase her heart rate 4 days per week.   She is taking medications regularly.    Patient notes being \"impaled\" by broken sapling while on a hike in [place].    Patient using over the counter bacitracin and changing bandages regularly.  No fever, chills, night sweats.    Review of Systems  Constitutional, HEENT, cardiovascular, pulmonary, gi and gu systems are negative, except as otherwise noted.      Objective    /80 (BP Location: " Left arm, Patient Position: Sitting, Cuff Size: Adult Regular)   Pulse 75   Temp 97.9  F (36.6  C) (Oral)   Resp 18   Ht 1.524 m (5')   Wt 71.3 kg (157 lb 1.6 oz)   LMP 09/07/2013   SpO2 99%   BMI 30.68 kg/m    Body mass index is 30.68 kg/m .  Physical Exam   GENERAL: alert and no distress  RESP: lungs clear to auscultation - no rales, rhonchi or wheezes  CV: regular rate and rhythm, normal S1 S2, no S3 or S4, no murmur, click or rub, no peripheral edema  MS: no gross musculoskeletal defects noted, no edema  SKIN: 3cm long x 2 cm wide open gash with increased erythema surrounding immediate area, clear to yellow blood tinged discharge noted on gauze and wound, tenderness to palpation with slight swelling directly up line of open wound            Signed Electronically by: Christiano Ramirez PA-C

## 2024-04-27 ENCOUNTER — MYC MEDICAL ADVICE (OUTPATIENT)
Dept: FAMILY MEDICINE | Facility: CLINIC | Age: 54
End: 2024-04-27
Payer: COMMERCIAL

## 2024-04-27 DIAGNOSIS — L03.115 CELLULITIS OF RIGHT LOWER EXTREMITY: Primary | ICD-10-CM

## 2024-04-29 ENCOUNTER — TELEPHONE (OUTPATIENT)
Dept: WOUND CARE | Facility: CLINIC | Age: 54
End: 2024-04-29

## 2024-04-29 NOTE — TELEPHONE ENCOUNTER
Consult received via [employer] from     Vane Ramirez PA-C in [facility]    for wound of the leg.    Please schedule with Carla Harmon NP, Jerome Quick M.D., or Roderick Ta M.D. at [facility] [place] for next available appointment.    **For all providers, Darlene Tobar PA-C, Dr. Ramos, Carla Harmon NP or Dr. Ta, please schedule a follow up 2-3 weeks after initial appointment.**  --If unable to schedule within 2-3 weeks then please place on cancellation list--    Is the patient able to make their own medical decisions? Yes    Can the patient be scheduled on a Wednesday ([place]) or Thursday ([place])? Yes    Is patient a NUNO lift? PLEASE INQUIRE WHEN MAKING THE APPOINTMENT AND PUT IN APPOINTMENT NOTES    Routing to  Wound Healing Scheduling.

## 2024-04-29 NOTE — TELEPHONE ENCOUNTER
"Yesi, let's see what it looks like to get her into wound care.  Hoping they could do some flushing or other tricks.  Referral placed.  Vane \"Christiano\" PUNEET Ramirez   "

## 2024-04-30 RX ORDER — SULFAMETHOXAZOLE/TRIMETHOPRIM 800-160 MG
1 TABLET ORAL 2 TIMES DAILY
Qty: 20 TABLET | Refills: 0 | Status: SHIPPED | OUTPATIENT
Start: 2024-04-30

## 2024-05-01 ENCOUNTER — TELEPHONE (OUTPATIENT)
Dept: SURGERY | Facility: CLINIC | Age: 54
End: 2024-05-01

## 2024-05-01 NOTE — TELEPHONE ENCOUNTER
Discussed photo with provider.  Provider would not be doing wound debris today as it is not in conjunction with a wound visit and would edges are allowing draining.  Stated pt should continue on a wait list for local wound clinic appt and could apply medihoney to edges at this time.    Called pt and advised.  Pt asked for Author to cancel today's appt.    Janeth SWEET   [facility]

## 2024-05-01 NOTE — TELEPHONE ENCOUNTER
Patient states she would like to know if you will be able to treat a puncture wound in her right leg that is not healing and has debris in it  Please call before her appointment today as she is driving a long ways. She needs a call back by two today please.

## 2024-05-06 ENCOUNTER — HOSPITAL ENCOUNTER (OUTPATIENT)
Dept: WOUND CARE | Facility: CLINIC | Age: 54
Discharge: HOME | End: 2024-05-06
Attending: PHYSICIAN ASSISTANT | Admitting: PHYSICIAN ASSISTANT
Payer: COMMERCIAL

## 2024-05-06 DIAGNOSIS — L03.115 CELLULITIS OF RIGHT LOWER EXTREMITY: ICD-10-CM

## 2024-05-06 DIAGNOSIS — L97.912 ULCER OF RIGHT LEG, WITH FAT LAYER EXPOSED (H): Primary | ICD-10-CM

## 2024-05-06 PROCEDURE — 97597 DBRDMT OPN WND 1ST 20 CM/<: CPT

## 2024-05-06 PROCEDURE — 99204 OFFICE O/P NEW MOD 45 MIN: CPT | Mod: 25

## 2024-05-06 PROCEDURE — 11042 DBRDMT SUBQ TIS 1ST 20SQCM/<: CPT

## 2024-05-06 PROCEDURE — G0463 HOSPITAL OUTPT CLINIC VISIT: HCPCS | Mod: 25

## 2024-05-06 NOTE — DISCHARGE INSTRUCTIONS
05/06/2024   Radha Bonilla   1970    A DME order for supplies has been placed to [employer]. If there are any issues with your order including not receiving the order please call [employer] at [phone]. They can also provide a tracking number for you.       Dressing changes outside of clinic are being performed by Patient    PLAN:  Do not take the second course of antibiotics yet. If the wound starts getting more painful and red or purulent drainage you can call us to determine if the antibiotics are needed.  Purchase vashe on [employer], [employer], or [employer]  Okay to shower but no bathing or submerging you leg in water. When showering klet the water run off the wound and pat dry     Wound Dressing Change: Right Anterior lower leg   - Wash your hands with soap and water before you begin your dressing change and prepare a clean surface for dressings.  - Cleanse with mild unscented soap (such as Cetaphil, Cerave or Dove) and water  - Apply small amount of VASHE on gauze, lay into wound bed, let sit for 10 minutes, remove gauze (do not rinse)  - Primary dressing: Fill a syringe with iodosorb and fill the undermining with the gel/paste. Clean out the syringe after each use with hot soapy water.   - Throw away the syringe after a week and use a new one.   - Secondary dressing: Cover with 1 4x4 Zetuvit plus silicone border  - Apply spandagrip size E from toes to behind the knee  Change daily       Compression:   Your compression is Spandagrip E and can be removed at night and put back on first thing in the morning.   Please remove compression dressing if toes turn blue and/or tingle and can not be relieved by raising the leg for one hour. If unable to reapply in the morning, keep compression on until next dressing change.  Walk as much as you can, as you are able. Whenever you sit raise your ankle above your hips to promote wound healing.     Elevation:  It is recommended that you elevate your legs  above the level of your heart for 30 minutes: approximately 2-3 times each day   Ways to do this:   - Lay on the couch or your bed and prop your legs up on pillows   - Recline back as far as you can go in your recliner and prop your legs on pillows.   Doing these things will help reduce the edema in your legs.    A diet high in protein is important for wound healing, we recommend getting 90 grams of protein per day. Taking protein shakes or bars are a good way to get extra protein in your diet.     Good sources of protein:  Pork 26g per 3 oz  Whey protein powder - 24g per scoop (on average)  Greek yogurt - 23g per 8oz   Chicken or Turkey - 23g per 3oz  Fish - 20-25g per 3oz  Beef - 18-23g per 3oz  Tofu - 10g per 1/2 cup  Navy beans - 20g per cup  Cottage cheese - 14g per 1/2 cup   Lentils - 13g per 1/4 cup  Beef jerky 13g per 1oz  2% milk - 8g per cup  Peanut butter - 8g per 2 tablespoons  Eggs - 6g per egg  Mixed nuts - 6g per 2oz       Main Provider: Carla Harmon NP May 6, 2024    Call us at [phone] if you have any questions about your wounds, if you have redness or swelling around your wound, have a fever of 101 degrees Fahrenheit or greater or if you have any other problems or concerns. We answer the phone Monday through Friday 8 am to 4 pm, please leave a message as we check the voicemail frequently throughout the day. If you have a concern over the weekend, please leave a message and we will return your call Monday. If the need is urgent, go to the ER or urgent care.    If you had a positive experience please indicate that on your patient satisfaction survey form that [facility] will be sending you.    It was a pleasure meeting with you today.  Thank you for allowing me and my team the privilege of caring for you today.  YOU are the reason we are here, and I truly hope we provided you with the excellent service you deserve.  Please let us know if there is anything else we can do for you so that we  can be sure you are leaving completely satisfied with your care experience.      If you have any billing related questions please call the [facility] Business office at [phone]. The clinic staff does not handle billing related matters.    If you are scheduled to have a follow up appointment, you will receive a reminder call the day before your visit. On the appointment day please arrive 15 minutes prior to your appointment time. If you are unable to keep that appointment, please call the clinic to cancel or reschedule. If you are more than 10 minutes late or greater for your scheduled appointment time, the clinic policy is that you may be asked to reschedule.

## 2024-05-06 NOTE — PROGRESS NOTES
[facility]    ASSESSMENT:   (L03.115) Cellulitis of right lower extremity  2.    Traumatic wound to the RLE    PLAN/DISCUSSION:   Removed small amount of tree bark from wound, debrided. Cleaned area with vashe in addition to irrigation. No pustulant or seropurulant drainage noted. No hematoma noted.   No cellulitis noted at this time  Wound care plan: Iodosorb into undermining and wound bed, cover with Zetuvit. Dressing will be performed by patient See bottom of note for detailed wound care and patient instructions  Patient works as a , discussed to wear compression and elevate leg as able.   Avoid submerging in water, she is able to shower.   Dietary recommendations discussed, see AVS       HISTORY OF PRESENT ILLNESS:   Radha Bonilla is a 53 year old female who was camping and fell on a tree branch of her right lower extremity approximately 4 weeks ago. She was seen for cellulitis and pulled out a piece of tree bark on 4/25, and has since noted the redness decreased. Today she denies fevers, chills, nausea. Currently in between antibiotics. No erythema noted   Patient Active Problem List   Diagnosis    CARDIOVASCULAR SCREENING; LDL GOAL LESS THAN 160    S/P hysterectomy    Post-operative pain    Screening for human papillomavirus    Skin lesion    Family history of skin cancer    Cherry angioma    Skin cancer screening    Daytime sleepiness    Vaginal dryness    Family history of diabetes mellitus       TREATMENT COURSE:  5/6/2024 : Presents for initial visit at our clinic.     Current Outpatient Medications   Medication Sig Dispense Refill    estradiol (VAGIFEM) 10 MCG TABS vaginal tablet Place 1 tablet (10 mcg) vaginally twice a week 24 tablet 3    mupirocin (BACTROBAN) 2 % external ointment Apply topically 3 times daily 22 g 1    sulfamethoxazole-trimethoprim (BACTRIM DS) 800-160 MG tablet Take 1 tablet by mouth 2 times daily 20 tablet 0    Vitamin D3 (CHOLECALCIFEROL)  25 mcg (1000 units) tablet Take 25 mcg by mouth daily       No current facility-administered medications for this encounter.       VITALS: LMP 09/07/2013      PHYSICAL EXAM:  GENERAL: Patient is alert and oriented and in no acute distress  CV: pedal pulses palpable  INTEGUMENTARY:   Wound (used by OP [facility] only) 05/06/24 1357 Right anterior;lower leg other (see comments) (Active)   Thickness/Stage full thickness 05/06/24 1300   Base red;slough 05/06/24 1300   Periwound intact 05/06/24 1300   Periwound Temperature warm 05/06/24 1300   Edges open 05/06/24 1300   Length (cm) 1.1 05/06/24 1300   Width (cm) 1.5 05/06/24 1300   Depth (cm) 0.2 05/06/24 1300   Wound (cm^2) 1.65 cm^2 05/06/24 1300   Wound Volume (cm^3) 0.33 cm^3 05/06/24 1300   Drainage Characteristics/Odor serosanguineous 05/06/24 1300   Drainage Amount moderate 05/06/24 1300   Care, Wound debrided 05/06/24 1300       Incision/Surgical Site 11/07/13 Abdomen (Active)       Incision/Surgical Site 01/24/20 Vagina (Active)             PROCEDURES: 4% topical lidocaine was applied to the wound by the nursing staff. Patient was determined to be capable of making their own medical decisions and informed consent was obtained. Using a curette a selective debridement of non-viable tissue was performed of <20cm2. Hemostasis was achieved with pressure. The patient tolerated the procedure well.      MDM: 45 minutes were spent on the date of the visit reviewing previous chart notes, evaluating patient and developing the treatment plan, this excludes any time spent on procedures.    PATIENT INSTRUCTIONS      Further instructions from your care team         05/06/2024   Radha Bonilla   1970    A DME order for supplies has been placed to [facility]. If there are any issues with your order including not receiving the order please call [employer] at [phone]. They can also provide a tracking number for you.         Dressing changes outside of clinic are  being performed by Patient    PLAN:  -Do not take the second course of antibiotics yet. If the wound starts getting more painful and red or purulent drainage you can call us to determine if the antibiotics are needed.  -Purchase vashe on [employer], [employer], or [employer]  -Okay to shower but no bathing or submerging you leg in water. When showering klet the water run off the wound and pat dry     Wound Dressing Change: Right Anterior lower leg   - Wash your hands with soap and water before you begin your dressing change and prepare a clean surface for dressings.  -Cleanse with mild unscented soap (such as Cetaphil, Cerave or Dove) and water  Apply small amount of VASHE on gauze, lay into wound bed, let sit for 10 minutes, remove gauze (do not rinse)  -Primary dressing: Fill a syringe with iodosorb and fill the undermining with the gel/paste. Clean out the syringe after each use with hot soapy water. Throw away the syringe after a week and use a new one.   -Secondary dressing: Cover with 1 4x4 Zetuvit plus silicone border  -Apply spandagrip size E from toes to behind the knee  -Apply daily       Compression:   Your compression is Spandagrip E and can be removed at night and put back on first thing in the morning.   Please remove compression dressing if toes turn blue and/or tingle and can not be relieved by raising the leg for one hour. If unable to reapply in the morning, keep compression on until next dressing change.  Walk as much as you can, as you are able. Whenever you sit raise your ankle above your hips to promote wound healing.         Elevation:  It is recommended that you elevate your legs above the level of your heart for 30 minutes: approximately 2-3 times each day   Ways to do this:   - Lay on the couch or your bed and prop your legs up on pillows   - Recline back as far as you can go in your recliner and prop your legs on pillows.   Doing these things will help reduce the edema in your legs.        A diet  high in protein is important for wound healing, we recommend getting 90 grams of protein per day. Taking protein shakes or bars are a good way to get extra protein in your diet.     Good sources of protein:  Pork 26g per 3 oz  Whey protein powder - 24g per scoop (on average)  Greek yogurt - 23g per 8oz   Chicken or Turkey - 23g per 3oz  Fish - 20-25g per 3oz  Beef - 18-23g per 3oz  Tofu - 10g per 1/2 cup  Navy beans - 20g per cup  Cottage cheese - 14g per 1/2 cup   Lentils - 13g per 1/4 cup  Beef jerky 13g per 1oz  2% milk - 8g per cup  Peanut butter - 8g per 2 tablespoons  Eggs - 6g per egg  Mixed nuts - 6g per 2oz       Main Provider: Carla Harmon NP May 6, 2024    Call us at [phone] if you have any questions about your wounds, if you have redness or swelling around your wound, have a fever of 101 degrees Fahrenheit or greater or if you have any other problems or concerns. We answer the phone Monday through Friday 8 am to 4 pm, please leave a message as we check the voicemail frequently throughout the day. If you have a concern over the weekend, please leave a message and we will return your call Monday. If the need is urgent, go to the ER or urgent care.    If you had a positive experience please indicate that on your patient satisfaction survey form that [facility] will be sending you.    It was a pleasure meeting with you today.  Thank you for allowing me and my team the privilege of caring for you today.  YOU are the reason we are here, and I truly hope we provided you with the excellent service you deserve.  Please let us know if there is anything else we can do for you so that we can be sure you are leaving completely satisfied with your care experience.      If you have any billing related questions please call the [facility] Business office at [phone]. The clinic staff does not handle billing related matters.    If you are scheduled to have a follow up appointment, you will receive a  reminder call the day before your visit. On the appointment day please arrive 15 minutes prior to your appointment time. If you are unable to keep that appointment, please call the clinic to cancel or reschedule. If you are more than 10 minutes late or greater for your scheduled appointment time, the clinic policy is that you may be asked to reschedule.          Electronically signed by Carla Harmon CNP on May 6, 2024

## 2024-05-06 NOTE — PROGRESS NOTES
Patient arrived for wound care visit. Certified Wound Care Nurse time spent evaluating patient record, completed a full evaluation and documented wound(s) & rose-wound skin; provided recommendation based on treatment plan. Applied dressing, reviewed discharge instructions, patient education, and discussed plan of care with appropriate medical team staff members and patient and/or family members.

## 2024-05-07 ENCOUNTER — MEDICAL CORRESPONDENCE (OUTPATIENT)
Dept: HEALTH INFORMATION MANAGEMENT | Facility: CLINIC | Age: 54
End: 2024-05-07
Payer: COMMERCIAL

## 2024-06-24 ENCOUNTER — HOSPITAL ENCOUNTER (OUTPATIENT)
Dept: MAMMOGRAPHY | Facility: CLINIC | Age: 54
Discharge: HOME | End: 2024-06-24
Attending: FAMILY MEDICINE | Admitting: FAMILY MEDICINE
Payer: COMMERCIAL

## 2024-06-24 DIAGNOSIS — Z12.31 VISIT FOR SCREENING MAMMOGRAM: ICD-10-CM

## 2024-06-24 PROCEDURE — 77063 BREAST TOMOSYNTHESIS BI: CPT

## 2024-10-03 ENCOUNTER — MYC MEDICAL ADVICE (OUTPATIENT)
Dept: FAMILY MEDICINE | Facility: CLINIC | Age: 54
End: 2024-10-03
Payer: COMMERCIAL

## 2024-10-03 DIAGNOSIS — H93.13 TINNITUS, BILATERAL: ICD-10-CM

## 2024-10-03 DIAGNOSIS — Q74.2 TOE ANOMALY: Primary | ICD-10-CM

## 2024-10-07 ENCOUNTER — PATIENT OUTREACH (OUTPATIENT)
Dept: CARE COORDINATION | Facility: CLINIC | Age: 54
End: 2024-10-07
Payer: COMMERCIAL

## 2024-10-23 ENCOUNTER — OFFICE VISIT (OUTPATIENT)
Dept: PODIATRY | Facility: CLINIC | Age: 54
End: 2024-10-23
Attending: FAMILY MEDICINE
Payer: COMMERCIAL

## 2024-10-23 ENCOUNTER — OFFICE VISIT (OUTPATIENT)
Dept: FAMILY MEDICINE | Facility: CLINIC | Age: 54
End: 2024-10-23
Payer: COMMERCIAL

## 2024-10-23 VITALS
BODY MASS INDEX: 32.38 KG/M2 | TEMPERATURE: 97.3 F | RESPIRATION RATE: 18 BRPM | WEIGHT: 160.6 LBS | DIASTOLIC BLOOD PRESSURE: 86 MMHG | HEART RATE: 88 BPM | SYSTOLIC BLOOD PRESSURE: 140 MMHG | HEIGHT: 59 IN | OXYGEN SATURATION: 98 %

## 2024-10-23 VITALS — WEIGHT: 160 LBS | HEART RATE: 86 BPM | OXYGEN SATURATION: 98 % | BODY MASS INDEX: 32.32 KG/M2

## 2024-10-23 DIAGNOSIS — M67.40 MUCOID CYST OF JOINT: Primary | ICD-10-CM

## 2024-10-23 DIAGNOSIS — Z71.84 TRAVEL ADVICE ENCOUNTER: Primary | ICD-10-CM

## 2024-10-23 PROCEDURE — 90691 TYPHOID VACCINE IM: CPT | Performed by: NURSE PRACTITIONER

## 2024-10-23 PROCEDURE — 90632 HEPA VACCINE ADULT IM: CPT | Performed by: NURSE PRACTITIONER

## 2024-10-23 PROCEDURE — 20612 ASPIRATE/INJ GANGLION CYST: CPT | Performed by: PODIATRIST

## 2024-10-23 PROCEDURE — 90472 IMMUNIZATION ADMIN EACH ADD: CPT | Performed by: NURSE PRACTITIONER

## 2024-10-23 PROCEDURE — 90471 IMMUNIZATION ADMIN: CPT | Performed by: NURSE PRACTITIONER

## 2024-10-23 PROCEDURE — 99402 PREV MED CNSL INDIV APPRX 30: CPT | Mod: 25 | Performed by: NURSE PRACTITIONER

## 2024-10-23 RX ORDER — ATOVAQUONE AND PROGUANIL HYDROCHLORIDE 250; 100 MG/1; MG/1
1 TABLET, FILM COATED ORAL DAILY
Qty: 16 TABLET | Refills: 0 | Status: SHIPPED | OUTPATIENT
Start: 2024-10-23

## 2024-10-23 RX ORDER — AZITHROMYCIN 500 MG/1
500 TABLET, FILM COATED ORAL DAILY
Qty: 3 TABLET | Refills: 0 | Status: SHIPPED | OUTPATIENT
Start: 2024-10-23 | End: 2024-10-26

## 2024-10-23 ASSESSMENT — PATIENT HEALTH QUESTIONNAIRE - PHQ9
SUM OF ALL RESPONSES TO PHQ QUESTIONS 1-9: 0
SUM OF ALL RESPONSES TO PHQ QUESTIONS 1-9: 0
10. IF YOU CHECKED OFF ANY PROBLEMS, HOW DIFFICULT HAVE THESE PROBLEMS MADE IT FOR YOU TO DO YOUR WORK, TAKE CARE OF THINGS AT HOME, OR GET ALONG WITH OTHER PEOPLE: NOT DIFFICULT AT ALL

## 2024-10-23 ASSESSMENT — ANXIETY QUESTIONNAIRES
3. WORRYING TOO MUCH ABOUT DIFFERENT THINGS: NOT AT ALL
4. TROUBLE RELAXING: NOT AT ALL
GAD7 TOTAL SCORE: 0
1. FEELING NERVOUS, ANXIOUS, OR ON EDGE: NOT AT ALL
2. NOT BEING ABLE TO STOP OR CONTROL WORRYING: NOT AT ALL
8. IF YOU CHECKED OFF ANY PROBLEMS, HOW DIFFICULT HAVE THESE MADE IT FOR YOU TO DO YOUR WORK, TAKE CARE OF THINGS AT HOME, OR GET ALONG WITH OTHER PEOPLE?: NOT DIFFICULT AT ALL
IF YOU CHECKED OFF ANY PROBLEMS ON THIS QUESTIONNAIRE, HOW DIFFICULT HAVE THESE PROBLEMS MADE IT FOR YOU TO DO YOUR WORK, TAKE CARE OF THINGS AT HOME, OR GET ALONG WITH OTHER PEOPLE: NOT DIFFICULT AT ALL
5. BEING SO RESTLESS THAT IT IS HARD TO SIT STILL: NOT AT ALL
GAD7 TOTAL SCORE: 0
7. FEELING AFRAID AS IF SOMETHING AWFUL MIGHT HAPPEN: NOT AT ALL
6. BECOMING EASILY ANNOYED OR IRRITABLE: NOT AT ALL
GAD7 TOTAL SCORE: 0
7. FEELING AFRAID AS IF SOMETHING AWFUL MIGHT HAPPEN: NOT AT ALL

## 2024-10-23 ASSESSMENT — PAIN SCALES - GENERAL: PAINLEVEL_OUTOF10: NO PAIN (0)

## 2024-10-23 NOTE — PROGRESS NOTES
"Nurse Note ( Pre-Travel Consult)    Itinerary:  [facility]    Departure Date: 12/29/2024    Return Date: 1/13/2024    Length of Trip 2 weeks    Reason for Travel: Tourism         Urban or rural: both    Accommodations: Hotel        IMMUNIZATION HISTORY  Have you received any immunizations within the past 4 weeks?  Yes, Hep B, Shingles  Have you ever fainted from having your blood drawn or from an injection?  No  Have you ever had a fever reaction to vaccination?   Swelling after Dt ap, COVID  Have you ever had any bad reaction or side effect from any vaccination?  No  Have you ever had hepatitis A or B vaccine?  Yes  Do you live (or work closely) with anyone who has AIDS, an AIDS-like condition, any other immune disorder or who is on chemotherapy for cancer?  No  Do you have a family history of immunodeficiency?  No  Have you received any injection of immune globulin or any blood products during the past 12 months?  No    [facility]    Subjective  Radha Bonilla is a 54 year old female seen today alone for counsultation for international travel.   Patient will be departing in  2 month(s) and  traveling with a group.      Patient itinerary :  will be in the [facility]> [facility]  2-3 days > [facility] for 3 nights > bus dirve 8 hours up to [facility]> [facility]  region of [facility] which risk for Malaria, Rabies, food borne illnesses, motor vehicle accidents, and Typhoid. exposure.       Patient's activities will include sightseeing, beach activities (salt water), and travel by car or other vehicle.   Patient's country of birth is [place]      Special medical concerns: none  Pre-travel questionnaire was completed by patient and reviewed by provider.     Vitals: BP (!) 140/86 (BP Location: Left arm, Patient Position: Sitting, Cuff Size: Adult Large)   Pulse 88   Temp 97.3  F (36.3  C) (Temporal)   Resp 18   Ht 1.499 m (4' 11\")   Wt 72.8 kg (160 lb 9.6 oz)   LMP 09/07/2013   " SpO2 98%   BMI 32.44 kg/m    BMI= Body mass index is 32.44 kg/m .    EXAM:  General:  Well-nourished, well-developed in no acute distress.  Appears to be stated age, interacts appropriately and expresses understanding of information given to patient.    Current Outpatient Medications   Medication Sig Dispense Refill    atovaquone-proguanil (MALARONE) 250-100 MG tablet Take 1 tablet by mouth daily. Start 2 days before exposure to Malaria and continue daily till  7 days after exposure. 16 tablet 0    azithromycin (ZITHROMAX) 500 MG tablet Take 1 tablet (500 mg) by mouth daily for 3 doses. Take 1 tablet a day for up to 3 days for severe diarrhea 3 tablet 0    estradiol (VAGIFEM) 10 MCG TABS vaginal tablet Place 1 tablet (10 mcg) vaginally twice a week 24 tablet 3    Vitamin D3 (CHOLECALCIFEROL) 25 mcg (1000 units) tablet Take 25 mcg by mouth daily      mupirocin (BACTROBAN) 2 % external ointment Apply topically 3 times daily (Patient not taking: Reported on 10/23/2024) 22 g 1     Patient Active Problem List   Diagnosis    CARDIOVASCULAR SCREENING; LDL GOAL LESS THAN 160    S/P hysterectomy    Post-operative pain    Screening for human papillomavirus    Skin lesion    Family history of skin cancer    Cherry angioma    Skin cancer screening    Daytime sleepiness    Vaginal dryness    Family history of diabetes mellitus     Allergies   Allergen Reactions    No Known Allergies          Immunizations discussed include:   Covid 19: Declined  Not concerned about risk of disease  Hepatitis A:  Ordered/given today, risks, benefits and side effects reviewed  Hepatitis B: Up to date  Influenza: Declined  Not concerned about risk of disease  Typhoid: Ordered/given today, risks, benefits and side effects reviewed  Rabies: Declined  reviewed managment of a animal bite or scratch (washing wound, seek medical care within 24 hours for post exposure prophylaxis )  Yellow Fever: Not indicated  Japanese Encephalitis: Not  indicated  Meningococcus: Not indicated  Tetanus/Diphtheria: Up to date  Measles/Mumps/Rubella: Titers recommended   Cholera: Not needed  Polio: Not indicated  Pneumococcal: Under age of 65  Varicella: Immune by disease history per patient report  Shingrix: Up to date  HPV:  Not indicated     TB: low risk     Altitude Exposure on this trip: no  Past tolerance to Altitude: na    ASSESSMENT/PLAN:  Radha was seen today for travel clinic.    Diagnoses and all orders for this visit:    Travel advice encounter  -     atovaquone-proguanil (MALARONE) 250-100 MG tablet; Take 1 tablet by mouth daily. Start 2 days before exposure to Malaria and continue daily till  7 days after exposure.  -     azithromycin (ZITHROMAX) 500 MG tablet; Take 1 tablet (500 mg) by mouth daily for 3 doses. Take 1 tablet a day for up to 3 days for severe diarrhea    Other orders  -     HEPATITIS A 19+ (HAVRIX/VAQTA)  -     TYPHOID VACCINE, IM      I have reviewed general recommendations for safe travel   including: food/water precautions, insect precautions, safer sex   practices given high prevalence of Zika, HIV and other STDs,   roadway safety. Educational materials and [employer] report provided.    Malaraia prophylaxis recommended: Malarone  Symptomatic treatment for traveler's diarrhea: azithromycin        Evacuation insurance advised and resources were provided to patient.    Total visit time 23 minutes  with over 50% of time spent counseling patient and shared decision making as detailed above.    Answers submitted by the patient for this visit:  Patient Health Questionnaire (Submitted on 10/23/2024)  If you checked off any problems, how difficult have these problems made it for you to do your work, take care of things at home, or get along with other people?: Not difficult at all  PHQ9 TOTAL SCORE: 0  Patient Health Questionnaire (G7) (Submitted on 10/23/2024)  CRISTINA 7 TOTAL SCORE: 0    Sabrina Duong CNP  Certificate in Travel  Health

## 2024-10-23 NOTE — PATIENT INSTRUCTIONS
Thank you for visiting the [facility] : [phone]  Today October 23, 2024 you received the    Hepatitis A Vaccine - Please return on 4/21/25 or later for your 2nd and final dose.    Typhoid - injectable. This vaccine is valid for two years.     Follow up vaccine appointments can be made as a NURSE ONLY visit at the Travel Clinic, (BE PREPARED TO WAIT, ) or at designated [facility].    If you are receiving the Rabies vaccines series, it is important that you follow the exact schedule ordered.     Pre-travel     We recommend that you purchase Medical Evacuation Insurance prior to your departure.  Https://wwwnc.cdc.gov/travel/page/insurance    [place] your travel plans with the [place] Department of State through STEP ( Smart Traveler Enrollment Program ) https://step.[place] [place].gov.  STEP is a free service to allow U[place] citizens and nationals traveling and living abroad to enroll their trip with the nearest [place] Embassy or Consulate.    Animal Exposure: Avoid all mammals even if they look healthy.  If there is a bite, scratch or even a lick, wash area immediately with soap and water for 15 minutes and seek medical care within 24 hours for evaluation of Rabies post exposure treatment.  Contact your Medical Evacuation Insurance.    Repiratory illness prevention strategies ( Covid and Influenza ) CDC recommendations:  Consider wearing a mask in crowded or poorly ventilated indoor areas, including on public transportation and in transportation hubs.  Hand washing: frequent, thorough handwashing with soap and water for 20 seconds. Use an alcohol-based hand  with at least 60% alcohol if soap and water are not readily available. Avoid touching face, nose, eyes, mouth unless you have done appropriate hand washing as above.  VACCINES: Staying up to date on COVID-19 vaccines significantly lowers the risk of getting very sick, being hospitalized, or dying from COVID-19. CDC  recommends that everyone stay up to date on their COVID-19 vaccines, especially people with weakened immune systems.    Travel Covid 19 Testing:  updated 12/06/2021  International travelers: Pre-travel:  See country specific Embassy websites or airline websites.    Post travel: CDC recommends getting tested 3-5 days after your trip     Post-travel illness:  Contact your provider or [facility] if you develop a fever, rash, cough, diarrhea or other symptoms for up to 1 year after travel.  Inform your healthcare provider when and where you traveled to.    Please call the [facility] with any questions [phone]  Or send your provider a 'My Chart' note.

## 2024-10-23 NOTE — PATIENT INSTRUCTIONS
We wish you continued good healing. If you have any questions or concerns, please do not hesitate to contact us at  [phone]    [facility] (secure e-mail communication and access to your chart) to send a message or to make an appointment.    Please remember to call and schedule a follow up appointment if one was recommended at your earliest convenience.     PODIATRY CLINIC HOURS  TELEPHONE NUMBER    Dr. Colten HAYWOODPSANDOVAL FACFAS        Clinics:  [facility]   [place]  Tuesday 1PM-6PM  [place]  Wednesday 745AM-330PM  [place]  Monday 2nd,4th  830AM-4PM  Thursday/Friday 745AM-230PM     [place] APPOINTMENTS  [phone]    [place] APPOINTMENTS  [phone]          If you need a medication refill, please contact us you may need lab work and/or a follow up visit prior to your refill (i.e. Antifungal medications).  If MRI needed please call Imaging at [phone]   HOW DO I GET MY KNEE SCOOTER? Knee scooters can be picked up at ANY Medical Supply stores with your knee scooter Prescription.  OR  Bring your signed prescription to an [facility] showroom.   Set up an appointment for your custom Orthotics. Call any Orthotics locations call [phone]

## 2024-10-23 NOTE — PROGRESS NOTES
S:  Patient seen today in consult from Dr. Louie and complains of mass on the left first toe.  Point to the dorsal medial IPJ.  Has had this for quite a while.  No pain if offloaded.  She has tried to pop and drain this in the past.  Denies surrounding ecchymosis erythema purulence or odor.  Denies mass on contralateral foot.  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  She curls her toes and has weight on the dorsum of the IPJ's.  Wound larger and she is concerned about wearing shoes for winter.         ROS:  A 10-point review of systems was performed and is positive for that noted in the HPI and as seen above.  All other areas are negative.          Allergies   Allergen Reactions    No Known Allergies        Current Outpatient Medications   Medication Sig Dispense Refill    atovaquone-proguanil (MALARONE) 250-100 MG tablet Take 1 tablet by mouth daily. Start 2 days before exposure to [place]aria and continue daily till  7 days after exposure. 16 tablet 0    azithromycin (ZITHROMAX) 500 MG tablet Take 1 tablet (500 mg) by mouth daily for 3 doses. Take 1 tablet a day for up to 3 days for severe diarrhea 3 tablet 0    estradiol (VAGIFEM) 10 MCG TABS vaginal tablet Place 1 tablet (10 mcg) vaginally twice a week 24 tablet 3    mupirocin (BACTROBAN) 2 % external ointment Apply topically 3 times daily (Patient not taking: Reported on 10/23/2024) 22 g 1    sulfamethoxazole-trimethoprim (BACTRIM DS) 800-160 MG tablet Take 1 tablet by mouth 2 times daily (Patient not taking: Reported on 10/23/2024) 20 tablet 0    Vitamin D3 (CHOLECALCIFEROL) 25 mcg (1000 units) tablet Take 25 mcg by mouth daily         Patient Active Problem List   Diagnosis    CARDIOVASCULAR SCREENING; LDL GOAL LESS THAN 160    S/P hysterectomy    Post-operative pain    Screening for human papillomavirus    Skin lesion    Family history of skin cancer    Cherry angioma    Skin cancer screening    Daytime sleepiness    Vaginal dryness    Family history of diabetes mellitus        Past Medical History:   Diagnosis Date    Gastro-oesophageal reflux disease     Menorrhagia        Past Surgical History:   Procedure Laterality Date    CERVIX REMOVAL  2020    HPV related, burst stitches soon afterwards, restitched    COLONOSCOPY N/A 2024    Procedure: COLONOSCOPY, FLEXIBLE, WITH LESION REMOVAL USING SNARE;  Surgeon: Kalli Castro MD;  Location: [facility]    COLONOSCOPY N/A 2024    Procedure: COLONOSCOPY, WITH POLYPECTOMY AND BIOPSY;  Surgeon: Kalli Castro MD;  Location: [facility]    COLONOSCOPY WITH CO2 INSUFFLATION N/A 2024    Procedure: Colonoscopy with CO2 insufflation;  Surgeon: Kalli Castro MD;  Location: [facility]    DILATION AND CURETTAGE      DILATION AND CURETTAGE N/A 2020    Procedure: EXAM UNDER ANESTHESIA  REPAIR OF VAGINAL CUFF;  Surgeon: Haritha Metz MD;  Location:  OR    LAPAROSCOPIC HYSTERECTOMY SUPRACERVICAL  2013    Procedure: LAPAROSCOPIC HYSTERECTOMY SUPRACERVICAL;  LAPAROSCOPIC SUPRACERVICAL HYSTERECTOMY,BILATERAL SALPINGECTOMY;  Surgeon: Olivier Plasencia MD;  Location:  OR       Family History   Problem Relation Age of Onset    Depression Mother     Anxiety Disorder Mother     Diabetes Father 75        oral meds?    Hypertension Father     Hyperlipidemia Father     Skin Cancer Father     Skin Cancer Sister     Coronary Artery Disease Maternal Grandmother         multiple MIs, first in 70s,  at 98    Hyperlipidemia Maternal Grandmother         strict diet after dx/MI    Skin Cancer Maternal Grandmother     Other Cancer Maternal Grandfather     Diabetes Paternal Grandmother          at 81, declined foot amputation    Skin Cancer Paternal Grandmother        Social History     Tobacco Use    Smoking status: Never    Smokeless tobacco: Never   Substance Use Topics    Alcohol use: Yes     Comment: SOCIALLY         Exam:    Vitals: Pulse 86   Wt 72.6 kg (160 lb)   LMP 2013   SpO2 98%   BMI 32.32 kg/m    BMI:  Body mass index is 32.32 kg/m .  Height: Data Unavailable    Constitutional/ general:  Pt is in no apparent distress, appears well-nourished.  Cooperative with history and physical exam.     Psych:  The patient answered questions appropriately.  Normal affect.  Seems to have reasonable expectations, in terms of treatment.     Eyes:  Visual scanning/ tracking without deficit.     Ears:  Response to auditory stimuli is normal.  negative hearing aid devices.  Auricles in proper alignment.     Lymphatic:  Popliteal lymph nodes not enlarged.     Lungs:  Non labored breathing, non labored speech. No cough.  No audible wheezing. Even, quiet breathing.       Vascular:  positive pedal pulses bilaterally for both the DP and PT arteries.  CFT < 3 sec.  Sl;ight ankle edema.  positive pedal hair growth.    Neuro:  Alert and oriented x 3. Coordinated gait.  Light touch sensation is intact to the L4, L5, S1 distributions. No obvious deficits.  No evidence of neurological-based weakness, spasticity, or contracture in the lower extremities.     Derm: Normal texture and turgor.  No erythema, ecchymosis, or cyanosis.      Musculoskeletal:    Lower extremity muscle strength is normal.  Patient is ambulatory without an assistive device or brace.  No gross deformities.  Normal arch.  No forefoot or rear foot deformities noted.  Normal ROM all fore foot and rearfoot joints.  No equinus.  There is a mass on the dorsal medial left hallux IPJ.   It is well encapsulated.   Positive pain with palpation.  Nonpulsitile.  No pain with ROM or stressing any tendons.  Appears to have clear fluid under skin.         A:  left first toe digital mucoid cyst    P:  Discussed cause of this with patient.  Will watch this for now and wear shoes which don't press on this.  Would recommend she stop doing the yoga move where she puts pressure on her toes.  Discussed offloading options.  Discussed exsanguination.  She would like to try this today.  Risks  complications, and efficacy of exsanguination discussed.  Patient understands that if we exsanguinate this it could come back immediately.  After written consent we blocked the area with cold spray and prepped in a normal sterile maner.  20 gauge needle was inserted into cyst and all fluid was removed.  Cyst then flushed with 1 cc of Kenalog 10 mg.  We exsanguinated all fluid until it was flat.  We dressed this with a 2 x 2 folded in half as a buttress and 1 inch Coban to give compression.  Will continue to keep pressure on this for the next week to try to keep this down.  Warned patient not to try to remove fluid and squeeze this herself as it could become infected.  If this drains at all return to clinic asap. If returns and painful we discussed surgical removal.  Even with this there is a chance this could return.    RETURN TO CLINIC PRN.  Thank you for allowing me participate in the care of this patient.        Colten Burris, MAXINE, FACFAS

## 2024-10-23 NOTE — LETTER
10/23/2024      Radha Bonilla  [address] [id number]      Dear Colleague,    Thank you for referring your patient, Radha Bonilla, to the [facility]. Please see a copy of my visit note below.    S:  Patient seen today in consult from Dr. Louie and complains of mass on the left first toe.  Point to the dorsal medial IPJ.  Has had this for quite a while.  No pain if offloaded.  She has tried to pop and drain this in the past.  Denies surrounding ecchymosis erythema purulence or odor.  Denies mass on contralateral foot.  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  She curls her toes and has weight on the dorsum of the IPJ's.  Wound larger and she is concerned about wearing shoes for winter.         ROS:  A 10-point review of systems was performed and is positive for that noted in the HPI and as seen above.  All other areas are negative.      Allergies   Allergen Reactions    No Known Allergies      Current Outpatient Medications   Medication Sig Dispense Refill    atovaquone-proguanil (MALARONE) 250-100 MG tablet Take 1 tablet by mouth daily. Start 2 days before exposure to [place]aria and continue daily till  7 days after exposure. 16 tablet 0    azithromycin (ZITHROMAX) 500 MG tablet Take 1 tablet (500 mg) by mouth daily for 3 doses. Take 1 tablet a day for up to 3 days for severe diarrhea 3 tablet 0    estradiol (VAGIFEM) 10 MCG TABS vaginal tablet Place 1 tablet (10 mcg) vaginally twice a week 24 tablet 3    mupirocin (BACTROBAN) 2 % external ointment Apply topically 3 times daily (Patient not taking: Reported on 10/23/2024) 22 g 1    sulfamethoxazole-trimethoprim (BACTRIM DS) 800-160 MG tablet Take 1 tablet by mouth 2 times daily (Patient not taking: Reported on 10/23/2024) 20 tablet 0    Vitamin D3 (CHOLECALCIFEROL) 25 mcg (1000 units) tablet Take 25 mcg by mouth daily       Patient Active Problem List   Diagnosis    CARDIOVASCULAR SCREENING; LDL GOAL LESS THAN 160    S/P  hysterectomy    Post-operative pain    Screening for human papillomavirus    Skin lesion    Family history of skin cancer    Cherry angioma    Skin cancer screening    Daytime sleepiness    Vaginal dryness    Family history of diabetes mellitus     Past Medical History:   Diagnosis Date    Gastro-oesophageal reflux disease     Menorrhagia        Past Surgical History:   Procedure Laterality Date    CERVIX REMOVAL  2020    HPV related, burst stitches soon afterwards, restitched    COLONOSCOPY N/A 2024    Procedure: COLONOSCOPY, FLEXIBLE, WITH LESION REMOVAL USING SNARE;  Surgeon: Kalli Castro MD;  Location: [facility]    COLONOSCOPY N/A 2024    Procedure: COLONOSCOPY, WITH POLYPECTOMY AND BIOPSY;  Surgeon: Kalli Castro MD;  Location: [facility]    COLONOSCOPY WITH CO2 INSUFFLATION N/A 2024    Procedure: Colonoscopy with CO2 insufflation;  Surgeon: Kalli Castro MD;  Location: [facility]    DILATION AND CURETTAGE      DILATION AND CURETTAGE N/A 2020    Procedure: EXAM UNDER ANESTHESIA  REPAIR OF VAGINAL CUFF;  Surgeon: Haritha Metz MD;  Location:  OR    LAPAROSCOPIC HYSTERECTOMY SUPRACERVICAL  2013    Procedure: LAPAROSCOPIC HYSTERECTOMY SUPRACERVICAL;  LAPAROSCOPIC SUPRACERVICAL HYSTERECTOMY,BILATERAL SALPINGECTOMY;  Surgeon: Olivier Plasencia MD;  Location:  OR       Family History   Problem Relation Age of Onset    Depression Mother     Anxiety Disorder Mother     Diabetes Father 75        oral meds?    Hypertension Father     Hyperlipidemia Father     Skin Cancer Father     Skin Cancer Sister     Coronary Artery Disease Maternal Grandmother         multiple MIs, first in 70s,  at 98    Hyperlipidemia Maternal Grandmother         strict diet after dx/MI    Skin Cancer Maternal Grandmother     Other Cancer Maternal Grandfather     Diabetes Paternal Grandmother          at 81, declined foot amputation    Skin Cancer Paternal Grandmother      Social History      Tobacco Use    Smoking status: Never    Smokeless tobacco: Never   Substance Use Topics    Alcohol use: Yes     Comment: SOCIALLY     Exam:    Vitals: Pulse 86   Wt 72.6 kg (160 lb)   LMP 09/07/2013   SpO2 98%   BMI 32.32 kg/m    BMI: Body mass index is 32.32 kg/m .  Height: Data Unavailable    Constitutional/ general:  Pt is in no apparent distress, appears well-nourished.  Cooperative with history and physical exam.     Psych:  The patient answered questions appropriately.  Normal affect.  Seems to have reasonable expectations, in terms of treatment.     Eyes:  Visual scanning/ tracking without deficit.     Ears:  Response to auditory stimuli is normal.  negative hearing aid devices.  Auricles in proper alignment.     Lymphatic:  Popliteal lymph nodes not enlarged.     Lungs:  Non labored breathing, non labored speech. No cough.  No audible wheezing. Even, quiet breathing.       Vascular:  positive pedal pulses bilaterally for both the DP and PT arteries.  CFT < 3 sec.  Sl;ight ankle edema.  positive pedal hair growth.    Neuro:  Alert and oriented x 3. Coordinated gait.  Light touch sensation is intact to the L4, L5, S1 distributions. No obvious deficits.  No evidence of neurological-based weakness, spasticity, or contracture in the lower extremities.     Derm: Normal texture and turgor.  No erythema, ecchymosis, or cyanosis.      Musculoskeletal:    Lower extremity muscle strength is normal.  Patient is ambulatory without an assistive device or brace.  No gross deformities.  Normal arch.  No forefoot or rear foot deformities noted.  Normal ROM all fore foot and rearfoot joints.  No equinus.  There is a mass on the dorsal medial left hallux IPJ.   It is well encapsulated.   Positive pain with palpation.  Nonpulsitile.  No pain with ROM or stressing any tendons.  Appears to have clear fluid under skin.       A:  left first toe digital mucoid cyst    P:  Discussed cause of this with patient.  Will watch  this for now and wear shoes which don't press on this.  Would recommend she stop doing the yoga move where she puts pressure on her toes.  Discussed offloading options.  Discussed exsanguination.  She would like to try this today.  Risks complications, and efficacy of exsanguination discussed.  Patient understands that if we exsanguinate this it could come back immediately.  After written consent we blocked the area with cold spray and prepped in a normal sterile maner.  20 gauge needle was inserted into cyst and all fluid was removed.  Cyst then flushed with 1 cc of Kenalog 10 mg.  We exsanguinated all fluid until it was flat.  We dressed this with a 2 x 2 folded in half as a buttress and 1 inch Coban to give compression.  Will continue to keep pressure on this for the next week to try to keep this down.  Warned patient not to try to remove fluid and squeeze this herself as it could become infected.  If this drains at all return to clinic asap. If returns and painful we discussed surgical removal.  Even with this there is a chance this could return.    RETURN TO CLINIC PRN.  Thank you for allowing me participate in the care of this patient.        Colten Burris DPM, FACFAS          Again, thank you for allowing me to participate in the care of your patient.        Sincerely,      Colten Burris DPM

## 2025-01-18 ENCOUNTER — MYC MEDICAL ADVICE (OUTPATIENT)
Dept: PODIATRY | Facility: CLINIC | Age: 55
End: 2025-01-18

## 2025-02-20 SDOH — HEALTH STABILITY: PHYSICAL HEALTH: ON AVERAGE, HOW MANY DAYS PER WEEK DO YOU ENGAGE IN MODERATE TO STRENUOUS EXERCISE (LIKE A BRISK WALK)?: 5 DAYS

## 2025-02-20 SDOH — HEALTH STABILITY: PHYSICAL HEALTH: ON AVERAGE, HOW MANY MINUTES DO YOU ENGAGE IN EXERCISE AT THIS LEVEL?: 60 MIN

## 2025-02-20 ASSESSMENT — SOCIAL DETERMINANTS OF HEALTH (SDOH): HOW OFTEN DO YOU GET TOGETHER WITH FRIENDS OR RELATIVES?: MORE THAN THREE TIMES A WEEK

## 2025-02-25 ENCOUNTER — OFFICE VISIT (OUTPATIENT)
Dept: FAMILY MEDICINE | Facility: CLINIC | Age: 55
End: 2025-02-25
Attending: FAMILY MEDICINE
Payer: COMMERCIAL

## 2025-02-25 VITALS
DIASTOLIC BLOOD PRESSURE: 84 MMHG | OXYGEN SATURATION: 100 % | TEMPERATURE: 97.7 F | SYSTOLIC BLOOD PRESSURE: 127 MMHG | HEIGHT: 59 IN | BODY MASS INDEX: 30.76 KG/M2 | RESPIRATION RATE: 16 BRPM | HEART RATE: 80 BPM | WEIGHT: 152.6 LBS

## 2025-02-25 DIAGNOSIS — R21 RASH AND NONSPECIFIC SKIN ERUPTION: ICD-10-CM

## 2025-02-25 DIAGNOSIS — R35.0 URINARY FREQUENCY: ICD-10-CM

## 2025-02-25 DIAGNOSIS — H93.13 TINNITUS, BILATERAL: ICD-10-CM

## 2025-02-25 DIAGNOSIS — Z87.42 HISTORY OF ABNORMAL CERVICAL PAP SMEAR: ICD-10-CM

## 2025-02-25 DIAGNOSIS — R40.0 DAYTIME SLEEPINESS: ICD-10-CM

## 2025-02-25 DIAGNOSIS — D75.839 THROMBOCYTOSIS: ICD-10-CM

## 2025-02-25 DIAGNOSIS — Z83.3 FAMILY HISTORY OF DIABETES MELLITUS: ICD-10-CM

## 2025-02-25 DIAGNOSIS — Z00.00 ROUTINE GENERAL MEDICAL EXAMINATION AT A HEALTH CARE FACILITY: Primary | ICD-10-CM

## 2025-02-25 DIAGNOSIS — N89.8 VAGINAL DRYNESS: ICD-10-CM

## 2025-02-25 DIAGNOSIS — Z13.6 CARDIOVASCULAR SCREENING; LDL GOAL LESS THAN 160: ICD-10-CM

## 2025-02-25 LAB
EST. AVERAGE GLUCOSE BLD GHB EST-MCNC: 105 MG/DL
HBA1C MFR BLD: 5.3 % (ref 0–5.6)

## 2025-02-25 PROCEDURE — 3079F DIAST BP 80-89 MM HG: CPT | Performed by: FAMILY MEDICINE

## 2025-02-25 PROCEDURE — 36415 COLL VENOUS BLD VENIPUNCTURE: CPT | Performed by: FAMILY MEDICINE

## 2025-02-25 PROCEDURE — 83036 HEMOGLOBIN GLYCOSYLATED A1C: CPT | Performed by: FAMILY MEDICINE

## 2025-02-25 PROCEDURE — 80061 LIPID PANEL: CPT | Performed by: FAMILY MEDICINE

## 2025-02-25 PROCEDURE — 3074F SYST BP LT 130 MM HG: CPT | Performed by: FAMILY MEDICINE

## 2025-02-25 PROCEDURE — 80053 COMPREHEN METABOLIC PANEL: CPT | Performed by: FAMILY MEDICINE

## 2025-02-25 PROCEDURE — 85027 COMPLETE CBC AUTOMATED: CPT | Performed by: FAMILY MEDICINE

## 2025-02-25 PROCEDURE — 99396 PREV VISIT EST AGE 40-64: CPT | Performed by: FAMILY MEDICINE

## 2025-02-25 PROCEDURE — 1126F AMNT PAIN NOTED NONE PRSNT: CPT | Performed by: FAMILY MEDICINE

## 2025-02-25 RX ORDER — MULTIVITAMIN WITH IRON
1 TABLET ORAL DAILY
COMMUNITY

## 2025-02-25 RX ORDER — ESTRADIOL 10 UG/1
10 TABLET, FILM COATED VAGINAL
Qty: 24 TABLET | Refills: 3 | Status: SHIPPED | OUTPATIENT
Start: 2025-02-27

## 2025-02-25 ASSESSMENT — PAIN SCALES - GENERAL: PAINLEVEL_OUTOF10: NO PAIN (0)

## 2025-02-25 NOTE — PATIENT INSTRUCTIONS
Patient Education   Preventive Care Advice   This is general advice given by our system to help you stay healthy. However, your care team may have specific advice just for you. Please talk to your care team about your preventive care needs.  Nutrition  Eat 5 or more servings of fruits and vegetables each day.  Try wheat bread, brown rice and whole grain pasta (instead of white bread, rice, and pasta).  Get enough calcium and vitamin D. Check the label on foods and aim for 100% of the RDA (recommended daily allowance).  Lifestyle  Exercise at least 150 minutes each week  (30 minutes a day, 5 days a week).  Do muscle strengthening activities 2 days a week. These help control your weight and prevent disease.  No smoking.  Wear sunscreen to prevent skin cancer.  Have a dental exam and cleaning every 6 months.  Yearly exams  See your health care team every year to talk about:  Any changes in your health.  Any medicines your care team has prescribed.  Preventive care, family planning, and ways to prevent chronic diseases.  Shots (vaccines)   HPV shots (up to age 26), if you've never had them before.  Hepatitis B shots (up to age 59), if you've never had them before.  COVID-19 shot: Get this shot when it's due.  Flu shot: Get a flu shot every year.  Tetanus shot: Get a tetanus shot every 10 years.  Pneumococcal, hepatitis A, and RSV shots: Ask your care team if you need these based on your risk.  Shingles shot (for age 50 and up)  General health tests  Diabetes screening:  Starting at age 35, Get screened for diabetes at least every 3 years.  If you are younger than age 35, ask your care team if you should be screened for diabetes.  Cholesterol test: At age 39, start having a cholesterol test every 5 years, or more often if advised.  Bone density scan (DEXA): At age 50, ask your care team if you should have this scan for osteoporosis (brittle bones).  Hepatitis C: Get tested at least once in your life.  STIs (sexually  transmitted infections)  Before age 24: Ask your care team if you should be screened for STIs.  After age 24: Get screened for STIs if you're at risk. You are at risk for STIs (including HIV) if:  You are sexually active with more than one person.  You don't use condoms every time.  You or a partner was diagnosed with a sexually transmitted infection.  If you are at risk for HIV, ask about PrEP medicine to prevent HIV.  Get tested for HIV at least once in your life, whether you are at risk for HIV or not.  Cancer screening tests  Cervical cancer screening: If you have a cervix, begin getting regular cervical cancer screening tests starting at age 21.  Breast cancer scan (mammogram): If you've ever had breasts, begin having regular mammograms starting at age 40. This is a scan to check for breast cancer.  Colon cancer screening: It is important to start screening for colon cancer at age 45.  Have a colonoscopy test every 10 years (or more often if you're at risk) Or, ask your provider about stool tests like a FIT test every year or Cologuard test every 3 years.  To learn more about your testing options, visit:   .  For help making a decision, visit:   https://bit.ly/tx00306.  Prostate cancer screening test: If you have a prostate, ask your care team if a prostate cancer screening test (PSA) at age 55 is right for you.  Lung cancer screening: If you are a current or former smoker ages 50 to 80, ask your care team if ongoing lung cancer screenings are right for you.  For informational purposes only. Not to replace the advice of your health care provider. Copyright   2023 [facility]. All rights reserved. Clinically reviewed by the [facility]. [place] 416878 - REV 01/24.  Learning About Stress  What is stress?     Stress is your body's response to a hard situation. Your body can have a physical, emotional, or mental response. Stress is a fact of life for most people, and it  affects everyone differently. What causes stress for you may not be stressful for someone else.  A lot of things can cause stress. You may feel stress when you go on a job interview, take a test, or run a race. This kind of short-term stress is normal and even useful. It can help you if you need to work hard or react quickly. For example, stress can help you finish an important job on time.  Long-term stress is caused by ongoing stressful situations or events. Examples of long-term stress include long-term health problems, ongoing problems at work, or conflicts in your family. Long-term stress can harm your health.  How does stress affect your health?  When you are stressed, your body responds as though you are in danger. It makes hormones that speed up your heart, make you breathe faster, and give you a burst of energy. This is called the fight-or-flight stress response. If the stress is over quickly, your body goes back to normal and no harm is done.  But if stress happens too often or lasts too long, it can have bad effects. Long-term stress can make you more likely to get sick, and it can make symptoms of some diseases worse. If you tense up when you are stressed, you may develop neck, shoulder, or low back pain. Stress is linked to high blood pressure and heart disease.  Stress also harms your emotional health. It can make you garrett, tense, or depressed. Your relationships may suffer, and you may not do well at work or school.  What can you do to manage stress?  You can try these things to help manage stress:   Do something active. Exercise or activity can help reduce stress. Walking is a great way to get started. Even everyday activities such as housecleaning or yard work can help.  Try yoga or eliezer chi. These techniques combine exercise and meditation. You may need some training at first to learn them.  Do something you enjoy. For example, listen to music or go to a movie. Practice your hobby or do volunteer  "work.  Meditate. This can help you relax, because you are not worrying about what happened before or what may happen in the future.  Do guided imagery. Imagine yourself in any setting that helps you feel calm. You can use online videos, books, or a teacher to guide you.  Do breathing exercises. For example:  From a standing position, bend forward from the waist with your knees slightly bent. Let your arms dangle close to the floor.  Breathe in slowly and deeply as you return to a standing position. Roll up slowly and lift your head last.  Hold your breath for just a few seconds in the standing position.  Breathe out slowly and bend forward from the waist.  Let your feelings out. Talk, laugh, cry, and express anger when you need to. Talking with supportive friends or family, a counselor, or a eliot leader about your feelings is a healthy way to relieve stress. Avoid discussing your feelings with people who make you feel worse.  Write. It may help to write about things that are bothering you. This helps you find out how much stress you feel and what is causing it. When you know this, you can find better ways to cope.  What can you do to prevent stress?  You might try some of these things to help prevent stress:  Manage your time. This helps you find time to do the things you want and need to do.  Get enough sleep. Your body recovers from the stresses of the day while you are sleeping.  Get support. Your family, friends, and community can make a difference in how you experience stress.  Limit your news feed. Avoid or limit time on social media or news that may make you feel stressed.  Do something active. Exercise or activity can help reduce stress. Walking is a great way to get started.  Where can you learn more?  Go to https://www.[employer].net/patiented  Enter N032 in the search box to learn more about \"Learning About Stress.\"  Current as of: October 24, 2023  Content Version: 14.3    2024 [employer]. " "  Care instructions adapted under license by your healthcare professional. If you have questions about a medical condition or this instruction, always ask your healthcare professional. [employer] disclaims any warranty or liability for your use of this information.    9 Ways to Cut Back on Drinking  Maybe you've found yourself drinking more alcohol than you'd prefer. If you want to cut back, here are some ideas to try.    Think before you drink.  Do you really want a drink, or is it just a habit? If you're used to having a drink at a certain time, try doing something else then.     Look for substitutes.  Find some no-alcohol drinks that you enjoy, like flavored seltzer water, tea with honey, or tonic with a slice of lime. Or try alcohol-free beer or \"virgin\" cocktails (without the alcohol).     Drink more water.  Use water to quench your thirst. Drink a glass of water before you have any alcohol. Have another glass along with every drink or between drinks.     Shrink your drink.  For example, have a bottle of beer instead of a pint. Use a smaller glass for wine. Choose drinks with lower alcohol content (ABV%). Or use less liquor and more mixer in cocktails.     Slow down.  It's easy to drink quickly and without thinking about it. Pay attention, and make each drink last longer.     Do the math.  Total up how much you spend on alcohol each month. How much is that a year? If you cut back, what could you do with the money you save?     Take a break.  Choose a day or two each week when you won't drink at all. Notice how you feel on those days, physically and emotionally. How did you sleep? Do you feel better? Over time, add more break days.     Count calories.  Would you like to lose some weight? For some people that's a good motivator for cutting back. Figure out how many calories are in each drink. How many does that add up to in a day? In a week? In a month?     Practice saying no.  Be ready when someone " "offers you a drink. Try: \"Thanks, I've had enough.\" Or \"Thanks, but I'm cutting back.\" Or \"No, thanks. I feel better when I drink less.\"   Current as of: November 15, 2023  Content Version: 14.3    2024 [employer].   Care instructions adapted under license by your healthcare professional. If you have questions about a medical condition or this instruction, always ask your healthcare professional. [employer] disclaims any warranty or liability for your use of this information.  Substance Use Disorder: Care Instructions  Overview     You can improve your life and health by stopping your use of alcohol or drugs. When you don't drink or use drugs, you may feel and sleep better. You may get along better with your family, friends, and coworkers. There are medicines and programs that can help with substance use disorder.  How can you care for yourself at home?  Here are some ways to help you stay sober and prevent relapse.  If you have been given medicine to help keep you sober or reduce your cravings, be sure to take it exactly as prescribed.  Talk to your doctor about programs that can help you stop using drugs or drinking alcohol.  Do not keep alcohol or drugs in your home.  Plan ahead. Think about what you'll say if other people ask you to drink or use drugs. Try not to spend time with people who drink or use drugs.  Use the time and money spent on drinking or drugs to do something that's important to you.  Preventing a relapse  Have a plan to deal with relapse. Learn to recognize changes in your thinking that lead you to drink or use drugs. Get help before you start to drink or use drugs again.  Try to stay away from situations, friends, or places that may lead you to drink or use drugs.  If you feel the need to drink alcohol or use drugs again, seek help right away. Call a trusted friend or family member. Some people get support from organizations such as Narcotics Anonymous or [employer] " or from treatment facilities.  If you relapse, get help as soon as you can. Some people make a plan with another person that outlines what they want that person to do for them if they relapse. The plan usually includes how to handle the relapse and who to notify in case of relapse.  Don't give up. Remember that a relapse doesn't mean that you have failed. Use the experience to learn the triggers that lead you to drink or use drugs. Then quit again. Recovery is a lifelong process. Many people have several relapses before they are able to quit for good.  Follow-up care is a key part of your treatment and safety. Be sure to make and go to all appointments, and call your doctor if you are having problems. It's also a good idea to know your test results and keep a list of the medicines you take.  When should you call for help?   Call [phone]  anytime you think you may need emergency care. For example, call if you or someone else:    Has overdosed or has withdrawal signs. Be sure to tell the emergency workers that you are or someone else is using or trying to quit using drugs. Overdose or withdrawal signs may include:  Losing consciousness.  Seizure.  Seeing or hearing things that aren't there (hallucinations).     Is thinking or talking about suicide or harming others.   Where to get help 24 hours a day, 7 days a week   If you or someone you know talks about suicide, self-harm, a mental health crisis, a substance use crisis, or any other kind of emotional distress, get help right away. You can:    Call the Suicide and Crisis Lifeline at 988.     Call 2-322-052-TALK (1-327.434.3978).     Text HOME to 769214 to access the Crisis Text Line.   Consider saving these numbers in your phone.  Go to [employer] for more information or to chat online.  Call your doctor now or seek immediate medical care if:    You are having withdrawal symptoms. These may include nausea or vomiting, sweating, shakiness, and anxiety.   Watch  "closely for changes in your health, and be sure to contact your doctor if:    You have a relapse.     You need more help or support to stop.   Where can you learn more?  Go to https://www.[employer].net/patiented  Enter H573 in the search box to learn more about \"Substance Use Disorder: Care Instructions.\"  Current as of: November 15, 2023  Content Version: 14.3    2024 [employer].   Care instructions adapted under license by your healthcare professional. If you have questions about a medical condition or this instruction, always ask your healthcare professional. [employer] disclaims any warranty or liability for your use of this information.       "

## 2025-02-25 NOTE — PROGRESS NOTES
Preventive Care Visit  [facility]  Maria Luisa Louie MD, Family Medicine  Feb 25, 2025  {Provider  Link to [facility] :169328}      Assessment & Plan     Routine general medical examination at a health care facility  Discussed diet, calcium and exercise.  Thin prep pap was not done.  Eye and dental care UTD or recommended f/u.  No immunizations needed today.  See orders below for tests ordered and screening needed.   - CBC with Platelets; Future  - Lipid panel reflex to direct LDL Fasting; Future  - Comprehensive metabolic panel (BMP + Alb, Alk Phos, ALT, AST, Total. Bili, TP); Future  - Hemoglobin A1c; Future  - Hemoglobin A1c  - Comprehensive metabolic panel (BMP + Alb, Alk Phos, ALT, AST, Total. Bili, TP)  - Lipid panel reflex to direct LDL Fasting  - CBC with Platelets    Rash and nonspecific skin eruption  Rash inbetween breasts, not clearly tinnitus nor eczema.  Rec trial of lotrimin 2x/day for 2-4 wks first.  If not helpful, can try hydrocortisone- if helpful but not clearing, can msg for stronger steroid cream rx.    Tinnitus, bilateral  Has audiology appt coming up    Vaginal dryness  Urinary frequency  Hasn't been taking the vagifem-- took x 2 wks, then mostly stopped.  Will try again- new rx sent.  Not currently sexually active.  - estradiol (VAGIFEM) 10 MCG TABS vaginal tablet; Place 1 tablet (10 mcg) vaginally twice a week.    Daytime sleepiness  Much improved after starting Vit D supplementation after last year's labs.    History of abnormal cervical Pap smear  H/o cervix removal due to SENA 2/3.  Has been doing paps at ob/gyn spec- last in ~8/24.- normal.  Requested records again. Okay to take over follow-up here, though need records.    CARDIOVASCULAR SCREENING; LDL GOAL LESS THAN 160  - Lipid panel reflex to direct LDL Fasting; Future  - Lipid panel reflex to direct LDL Fasting    Family history of diabetes mellitus  - Comprehensive metabolic panel (BMP + Alb, Alk Phos, ALT,  "AST, Total. Bili, TP); Future  - Hemoglobin A1c; Future  - Hemoglobin A1c  - Comprehensive metabolic panel (BMP + Alb, Alk Phos, ALT, AST, Total. Bili, TP)    {Patient advised of split billing (Optional):526951}        BMI  Estimated body mass index is 30.82 kg/m  as calculated from the following:    Height as of this encounter: 1.499 m (4' 11\").    Weight as of this encounter: 69.2 kg (152 lb 9.6 oz).   {Weight Management Plan needed for ACO:654299}    Counseling  Appropriate preventive services were addressed with this patient via screening, questionnaire, or discussion as appropriate for fall prevention, nutrition, physical activity, Tobacco-use cessation, social engagement, weight loss and cognition.  Checklist reviewing preventive services available has been given to the patient.  Reviewed patient's diet, addressing concerns and/or questions.   The patient was instructed to see the dentist every 6 months.   She is at risk for psychosocial distress and has been provided with information to reduce risk.   The patient reports drinking more than 3 alcoholic drinks per day and/or more than 7 drhnks per week. The patient was counseled and given information about possible harmful effects of excessive alcohol intake.    {FOLLOW UP PLANS (Optional) Includes COVID19 Treatment Plan:499646}      David Garcia is a 54 year old, presenting for the following:  Physical        2/25/2025     2:16 PM   Additional Questions   Roomed by Bekah MOLINA shin wound. Wound clinic- kept going back in, but they got a little more out.  Little bit numb    [place]- didn't take malaria drugs.    Doing well overall.    Not sure what might happen with insurance.    Did go back to ob/gyn- normal pap  Sometime in the last year.          {MA/LPN/RN Pre-Provider Visit Orders- hCG/UA/Strep (Optional):319358}  {SUPERLIST (Optional):553048}  {additonal problems for provider to add (Optional):153518}  Health Care " Directive  Patient does not have a Health Care Directive: Discussed advance care planning with patient; however, patient declined at this time.      2/20/2025   General Health   How would you rate your overall physical health? Excellent   Feel stress (tense, anxious, or unable to sleep) To some extent   (!) STRESS CONCERN      2/20/2025   Nutrition   Three or more servings of calcium each day? Yes   Diet: Regular (no restrictions)   How many servings of fruit and vegetables per day? (!) 2-3   How many sweetened beverages each day? 0-1         2/20/2025   Exercise   Days per week of moderate/strenous exercise 5 days   Average minutes spent exercising at this level 60 min         2/20/2025   Social Factors   Frequency of gathering with friends or relatives More than three times a week   Worry food won't last until get money to buy more No   Food not last or not have enough money for food? No   Do you have housing? (Housing is defined as stable permanent housing and does not include staying ouside in a car, in a tent, in an abandoned building, in an overnight shelter, or couch-surfing.) Yes   Are you worried about losing your housing? No   Lack of transportation? No   Unable to get utilities (heat,electricity)? No         2/20/2025   Fall Risk   Fallen 2 or more times in the past year? No   Trouble with walking or balance? No          2/20/2025   Dental   Dentist two times every year? (!) NO         2/12/2024   TB Screening   Were you born outside of the [place]? No         Today's PHQ-2 Score:       2/25/2025     1:55 PM   PHQ-2 ( 1999 [employer])   Q1: Little interest or pleasure in doing things 0   Q2: Feeling down, depressed or hopeless 1   PHQ-2 Score 1    Q1: Little interest or pleasure in doing things Not at all   Q2: Feeling down, depressed or hopeless Several days   PHQ-2 Score 1       Patient-reported           2/20/2025   Substance Use   Alcohol more than 3/day or more than 7/wk Yes   How often do you have a drink  containing alcohol 2 to 3 times a week   How many alcohol drinks on typical day 3 or 4   How often do you have 5+ drinks at one occasion Less than monthly   Audit 2/3 Score 2   How often not able to stop drinking once started Never   How often failed to do what normally expected Never   How often needed first drink in am after a heavy drinking session Never   How often feeling of guilt or remorse after drinking Never   How often unable to remember what happened the night before Never   Have you or someone else been injured because of your drinking No   Has anyone been concerned or suggested you cut down on drinking No   TOTAL SCORE - AUDIT 5   Do you use any other substances recreationally? (!) CANNABIS PRODUCTS     Social History     Tobacco Use    Smoking status: Never    Smokeless tobacco: Never   Vaping Use    Vaping status: Never Used   Substance Use Topics    Alcohol use: Yes     Comment: SOCIALLY    Drug use: No     {Provider  If there are gaps in the social history shown above, please follow the link to update and then refresh the note Link to Social and Substance History :412899}      6/24/2024   LAST FHS-7 RESULTS   1st degree relative breast or ovarian cancer No   Any relative bilateral breast cancer No   Any male have breast cancer No   Any ONE woman have BOTH breast AND ovarian cancer No   Any woman with breast cancer before 50yrs No   2 or more relatives with breast AND/OR ovarian cancer No   2 or more relatives with breast AND/OR bowel cancer No     {If any of the questions to the FHS7 are answered yes, consider referral for genetic counseling.    Additional indications for genetic referral include personal history of breast or ovarian cancer, genetic mutation in 1st degree relative which increases risk of breast cancer including BRCA1, BRCA2, NARINDER, PALB 2, TP53, CHEK2, PTEN, CDH1, STK11 (per ACS) and/or 1st degree relative with history of pancreatic or high-risk prostate cancer (per NCCN):587056}  "  {Mammogram Decision Support (Optional):329725}        2/20/2025   STI Screening   New sexual partner(s) since last STI/HIV test? No     History of abnormal Pap smear: { :156495}        11/6/2019     3:13 PM   PAP / HPV   PAP-ABSTRACT See Scanned Document           This result is from an external source.     ASCVD Risk   The 10-year ASCVD risk score (Chadwick FREGOSO, et al., 2019) is: 2.6%    Values used to calculate the score:      Age: 54 years      Sex: Female      Is Non- : No      Diabetic: No      Tobacco smoker: No      Systolic Blood Pressure: 140 mmHg      Is BP treated: No      HDL Cholesterol: 66 mg/dL      Total Cholesterol: 234 mg/dL    {Link to Fracture Risk Assessment Tool (Optional):362813}    {Provider  REQUIRED FOR AWV Use the storyboard to review patient history, after sections have been marked as reviewed, refresh note to capture documentation:138374}   Reviewed and updated as needed this visit by Provider                    {HISTORY OPTIONS (Optional):124872}    {ROS Picklists (Optional):445540}     Objective    Exam  LMP 09/07/2013    Estimated body mass index is 32.32 kg/m  as calculated from the following:    Height as of 10/23/24: 1.499 m (4' 11\").    Weight as of 10/23/24: 72.6 kg (160 lb).    Physical Exam  {Exam Choices (Optional):256193}        Signed Electronically by: Maria Luisa Louie MD  {Email feedback regarding this note to [email or web address]   :943790}  " Electronically by: Maria Luisa Louie MD

## 2025-02-26 LAB
ALBUMIN SERPL BCG-MCNC: 4.6 G/DL (ref 3.5–5.2)
ALP SERPL-CCNC: 66 U/L (ref 40–150)
ALT SERPL W P-5'-P-CCNC: 58 U/L (ref 0–50)
ANION GAP SERPL CALCULATED.3IONS-SCNC: 12 MMOL/L (ref 7–15)
AST SERPL W P-5'-P-CCNC: 36 U/L (ref 0–45)
BILIRUB SERPL-MCNC: 1.2 MG/DL
BUN SERPL-MCNC: 11.8 MG/DL (ref 6–20)
CALCIUM SERPL-MCNC: 9.5 MG/DL (ref 8.8–10.4)
CHLORIDE SERPL-SCNC: 103 MMOL/L (ref 98–107)
CHOLEST SERPL-MCNC: 256 MG/DL
CREAT SERPL-MCNC: 0.87 MG/DL (ref 0.51–0.95)
EGFRCR SERPLBLD CKD-EPI 2021: 78 ML/MIN/1.73M2
ERYTHROCYTE [DISTWIDTH] IN BLOOD BY AUTOMATED COUNT: 13.4 % (ref 10–15)
FASTING STATUS PATIENT QL REPORTED: YES
FASTING STATUS PATIENT QL REPORTED: YES
GLUCOSE SERPL-MCNC: 90 MG/DL (ref 70–99)
HCO3 SERPL-SCNC: 25 MMOL/L (ref 22–29)
HCT VFR BLD AUTO: 46.1 % (ref 35–47)
HDLC SERPL-MCNC: 74 MG/DL
HGB BLD-MCNC: 15.9 G/DL (ref 11.7–15.7)
LDLC SERPL CALC-MCNC: 158 MG/DL
MCH RBC QN AUTO: 30.2 PG (ref 26.5–33)
MCHC RBC AUTO-ENTMCNC: 34.5 G/DL (ref 31.5–36.5)
MCV RBC AUTO: 88 FL (ref 78–100)
NONHDLC SERPL-MCNC: 182 MG/DL
PLATELET # BLD AUTO: 665 10E3/UL (ref 150–450)
POTASSIUM SERPL-SCNC: 4 MMOL/L (ref 3.4–5.3)
PROT SERPL-MCNC: 7.2 G/DL (ref 6.4–8.3)
RBC # BLD AUTO: 5.26 10E6/UL (ref 3.8–5.2)
SODIUM SERPL-SCNC: 140 MMOL/L (ref 135–145)
TRIGL SERPL-MCNC: 119 MG/DL
WBC # BLD AUTO: 8.6 10E3/UL (ref 4–11)

## 2025-03-10 NOTE — RESULT ENCOUNTER NOTE
Called pt and discussed results...  Lipids abnl with high LDL, will cont work on diet/exercise.  CMP okay other than slightly elevated ALT, stable.  CBC/plts shows higher plts in 600s now (500s last year), and slightly elevated hgb.  Rec coming back in for CBC/dif/plts and peripheral smear, with follow-up with hematology unless peripheral smear is fine.  Referral placed.    Malcolm Louie MD

## 2025-03-12 ENCOUNTER — LAB (OUTPATIENT)
Dept: LAB | Facility: CLINIC | Age: 55
End: 2025-03-12
Payer: COMMERCIAL

## 2025-03-12 DIAGNOSIS — D75.839 THROMBOCYTOSIS: ICD-10-CM

## 2025-03-12 LAB
BASOPHILS # BLD AUTO: 0 10E3/UL (ref 0–0.2)
BASOPHILS NFR BLD AUTO: 0 %
EOSINOPHIL # BLD AUTO: 0.2 10E3/UL (ref 0–0.7)
EOSINOPHIL NFR BLD AUTO: 2 %
ERYTHROCYTE [DISTWIDTH] IN BLOOD BY AUTOMATED COUNT: 13.3 % (ref 10–15)
HCT VFR BLD AUTO: 46.2 % (ref 35–47)
HGB BLD-MCNC: 15.5 G/DL (ref 11.7–15.7)
IMM GRANULOCYTES # BLD: 0 10E3/UL
IMM GRANULOCYTES NFR BLD: 0 %
LYMPHOCYTES # BLD AUTO: 3.5 10E3/UL (ref 0.8–5.3)
LYMPHOCYTES NFR BLD AUTO: 43 %
MCH RBC QN AUTO: 29.4 PG (ref 26.5–33)
MCHC RBC AUTO-ENTMCNC: 33.5 G/DL (ref 31.5–36.5)
MCV RBC AUTO: 88 FL (ref 78–100)
MONOCYTES # BLD AUTO: 0.5 10E3/UL (ref 0–1.3)
MONOCYTES NFR BLD AUTO: 6 %
NEUTROPHILS # BLD AUTO: 3.9 10E3/UL (ref 1.6–8.3)
NEUTROPHILS NFR BLD AUTO: 49 %
PLATELET # BLD AUTO: 599 10E3/UL (ref 150–450)
RBC # BLD AUTO: 5.27 10E6/UL (ref 3.8–5.2)
RETICS # AUTO: 0.1 10E6/UL (ref 0.03–0.1)
RETICS/RBC NFR AUTO: 2 % (ref 0.5–2)
WBC # BLD AUTO: 8.1 10E3/UL (ref 4–11)

## 2025-03-12 PROCEDURE — 36415 COLL VENOUS BLD VENIPUNCTURE: CPT

## 2025-03-12 PROCEDURE — 85025 COMPLETE CBC W/AUTO DIFF WBC: CPT

## 2025-03-12 PROCEDURE — 85045 AUTOMATED RETICULOCYTE COUNT: CPT

## 2025-03-19 NOTE — TELEPHONE ENCOUNTER
RECORDS STATUS - ALL OTHER DIAGNOSIS      RECORDS RECEIVED FROM: [facility]   NOTES STATUS DETAILS   OFFICE NOTE from referring provider Epic 2/25/2025 - Dr. Maria Luisa Louie   MEDICATION LIST [facility]    LABS     PATHOLOGY REPORTS     ANYTHING RELATED TO DIAGNOSIS Epic 2/25/2025

## 2025-04-09 ENCOUNTER — OFFICE VISIT (OUTPATIENT)
Dept: PODIATRY | Facility: CLINIC | Age: 55
End: 2025-04-09
Payer: COMMERCIAL

## 2025-04-09 VITALS — WEIGHT: 152 LBS | BODY MASS INDEX: 30.64 KG/M2 | HEIGHT: 59 IN

## 2025-04-09 DIAGNOSIS — M67.40 MUCOID CYST OF JOINT: Primary | ICD-10-CM

## 2025-04-09 NOTE — PATIENT INSTRUCTIONS
We wish you continued good healing. If you have any questions or concerns, please do not hesitate to contact us at  [phone]    [employer] (secure e-mail communication and access to your chart) to send a message or to make an appointment.    Please remember to call and schedule a follow up appointment if one was recommended at your earliest convenience.     PODIATRY CLINIC HOURS  TELEPHONE NUMBER    Dr. Colten HAYWOODPSANDOVAL FACFAS        Clinics:  [facility]   [place]  Tuesday 1PM-6PM  [place]  Wednesday 745AM-330PM  [place]  Monday 2nd,4th  830AM-4PM  Thursday/Friday 745AM-230PM     [place] APPOINTMENTS  [phone]    [place] APPOINTMENTS  [phone]          If you need a medication refill, please contact us you may need lab work and/or a follow up visit prior to your refill (i.e. Antifungal medications).  If MRI needed please call Imaging at [phone]   HOW DO I GET MY KNEE SCOOTER? Knee scooters can be picked up at ANY Medical Supply stores with your knee scooter Prescription.  OR  Bring your signed prescription to an [facility] showroom.   Set up an appointment for your custom Orthotics. Call any Orthotics locations call [phone]

## 2025-04-09 NOTE — LETTER
4/9/2025      Radha Bonilla  [address] [address]      Dear Colleague,    Thank you for referring your patient, Radha Bonilla, to the [facility]. Please see a copy of my visit note below.    S:    10/23/24   Patient seen today in consult from Dr. Louie and complains of mass on the left first toe.  Point to the dorsal medial IPJ.  Has had this for quite a while.  No pain if offloaded.  She has tried to pop and drain this in the past.  Denies surrounding ecchymosis erythema purulence or odor.  Denies mass on contralateral foot.  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  She curls her toes and has weight on the dorsum of the IPJ's.  Wound larger and she is concerned about wearing shoes for winter.      4/9/25 patient returns for left hallux digital mucoid cyst.  We exsanguinated last visit.  It was gone until recently.  She did go on a 13-hour flight and her feet swelled up a lot.  She is wondering if it started after this.  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    ROS:  see above       Allergies   Allergen Reactions    No Known Allergies        Current Outpatient Medications   Medication Sig Dispense Refill    estradiol (VAGIFEM) 10 MCG TABS vaginal tablet Place 1 tablet (10 mcg) vaginally twice a week. 24 tablet 3    magnesium 250 MG tablet Take 1 tablet by mouth daily.      Vitamin D3 (CHOLECALCIFEROL) 25 mcg (1000 units) tablet Take 25 mcg by mouth daily         Patient Active Problem List   Diagnosis    CARDIOVASCULAR SCREENING; LDL GOAL LESS THAN 160    S/P hysterectomy    Post-operative pain    History of abnormal cervical Pap smear    Skin lesion    Family history of skin cancer    Cherry angioma    Skin cancer screening    Daytime sleepiness    Vaginal dryness    Family history of diabetes mellitus       Past Medical History:   Diagnosis Date    Gastro-oesophageal reflux disease     Menorrhagia        Past Surgical History:   Procedure  Laterality Date    CERVIX REMOVAL  2020    HPV related, burst stitches soon afterwards, restitched    COLONOSCOPY N/A 2024    Procedure: COLONOSCOPY, FLEXIBLE, WITH LESION REMOVAL USING SNARE;  Surgeon: Kalli Castro MD;  Location: [facility] OR    COLONOSCOPY N/A 2024    Procedure: COLONOSCOPY, WITH POLYPECTOMY AND BIOPSY;  Surgeon: Kalli Castro MD;  Location: [facility] OR    COLONOSCOPY WITH CO2 INSUFFLATION N/A 2024    Procedure: Colonoscopy with CO2 insufflation;  Surgeon: Kalli Castro MD;  Location: [facility] OR    DILATION AND CURETTAGE      DILATION AND CURETTAGE N/A 2020    Procedure: EXAM UNDER ANESTHESIA  REPAIR OF VAGINAL CUFF;  Surgeon: Haritha Metz MD;  Location:  OR    LAPAROSCOPIC HYSTERECTOMY SUPRACERVICAL  2013    Procedure: LAPAROSCOPIC HYSTERECTOMY SUPRACERVICAL;  LAPAROSCOPIC SUPRACERVICAL HYSTERECTOMY,BILATERAL SALPINGECTOMY;  Surgeon: Olivier Plasencia MD;  Location:  OR       Family History   Problem Relation Age of Onset    Depression Mother     Anxiety Disorder Mother     Factor V Leiden deficiency Mother         had clots, on coumadin    Deep Vein Thrombosis (DVT) Mother     Diabetes Father 75        oral meds?    Hypertension Father     Hyperlipidemia Father     Skin Cancer Father     Skin Cancer Sister     Factor V Leiden deficiency Sister     Coronary Artery Disease Maternal Grandmother         multiple MIs, first in 70s,  at 98    Hyperlipidemia Maternal Grandmother         strict diet after dx/MI    Skin Cancer Maternal Grandmother     Factor V Leiden deficiency Maternal Grandmother     Other Cancer Maternal Grandfather     Diabetes Paternal Grandmother          at 81, declined foot amputation    Skin Cancer Paternal Grandmother        Social History     Tobacco Use    Smoking status: Never    Smokeless tobacco: Never   Substance Use Topics    Alcohol use: Yes     Comment: SOCIALLY     Exam:    Vitals: LMP 2013   BMI: There is no  height or weight on file to calculate BMI.  Height: Data Unavailable    Constitutional/ general:  Pt is in no apparent distress, appears well-nourished.  Cooperative with history and physical exam.     Psych:  The patient answered questions appropriately.  Normal affect.  Seems to have reasonable expectations, in terms of treatment.     Eyes:  Visual scanning/ tracking without deficit.     Ears:  Response to auditory stimuli is normal.  negative hearing aid devices.  Auricles in proper alignment.     Lymphatic:  Popliteal lymph nodes not enlarged.     Lungs:  Non labored breathing, non labored speech. No cough.  No audible wheezing. Even, quiet breathing.       Vascular:  positive pedal pulses bilaterally for both the DP and PT arteries.  CFT < 3 sec.  Sl;ight ankle edema.  positive pedal hair growth.    Neuro:  Alert and oriented x 3. Coordinated gait.  Light touch sensation is intact to the L4, L5, S1 distributions. No obvious deficits.  No evidence of neurological-based weakness, spasticity, or contracture in the lower extremities.     Derm: Normal texture and turgor.  No erythema, ecchymosis, or cyanosis.      Musculoskeletal:    Lower extremity muscle strength is normal.  Patient is ambulatory without an assistive device or brace.  No gross deformities.  Normal arch.  No forefoot or rear foot deformities noted.  Normal ROM all fore foot and rearfoot joints.  No equinus.  There is a mass on the dorsal medial left hallux IPJ.   It is well encapsulated.   Positive pain with palpation.  Nonpulsitile.  No pain with ROM or stressing any tendons.  Appears to have clear fluid under skin.         A:  left first toe digital mucoid cyst    P:  Discussed any type of pressure could have caused this return.  The increased swelling could have caused this.  Also discussed if she sits on the dorsum of her feet a lot this could cause return of cyst.  Discussed trying to keep this offloaded in the future.  Discussed surgery  versus exsanguination.  She would like to exsanguinate today.  Risks complications, and efficacy of exsanguination discussed.  Patient understands that if we exsanguinate this it could come back immediately.  After written consent we blocked the area with cold spray and prepped in a normal sterile maner.  20 gauge needle was inserted into cyst and all fluid was removed.  Cyst then flushed with 1/4 cc of Kenalog 10 mg.  We exsanguinated all fluid until it was flat.  We dressed this with a 2 x 2 folded in half as a buttress and 1 inch Coban to give compression.  Will continue to keep pressure on this for the next week to try to keep this down.  Warned patient not to try to remove fluid and squeeze this herself as it could become infected.  If this drains at all return to clinic asap. If returns and painful we discussed surgical removal.  Even with this there is a chance this could return.  She lives in [facility].  If she would like this surgically removed we will refer to one of my colleagues who works in the [facility].      Colten Burris DPM, FACFAS      Again, thank you for allowing me to participate in the care of your patient.        Sincerely,        Colten Burris DPM    Electronically signed

## 2025-04-09 NOTE — PROGRESS NOTES
S:    10/23/24   Patient seen today in consult from Dr. Louie and complains of mass on the left first toe.  Point to the dorsal medial IPJ.  Has had this for quite a while.  No pain if offloaded.  She has tried to pop and drain this in the past.  Denies surrounding ecchymosis erythema purulence or odor.  Denies mass on contralateral foot.  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  She curls her toes and has weight on the dorsum of the IPJ's.  Wound larger and she is concerned about wearing shoes for winter.      4/9/25 patient returns for left hallux digital mucoid cyst.  We exsanguinated last visit.  It was gone until recently.  She did go on a 13-hour flight and her feet swelled up a lot.  She is wondering if it started after this.  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    ROS:  see above         Allergies   Allergen Reactions    No Known Allergies        Current Outpatient Medications   Medication Sig Dispense Refill    estradiol (VAGIFEM) 10 MCG TABS vaginal tablet Place 1 tablet (10 mcg) vaginally twice a week. 24 tablet 3    magnesium 250 MG tablet Take 1 tablet by mouth daily.      Vitamin D3 (CHOLECALCIFEROL) 25 mcg (1000 units) tablet Take 25 mcg by mouth daily         Patient Active Problem List   Diagnosis    CARDIOVASCULAR SCREENING; LDL GOAL LESS THAN 160    S/P hysterectomy    Post-operative pain    History of abnormal cervical Pap smear    Skin lesion    Family history of skin cancer    Cherry angioma    Skin cancer screening    Daytime sleepiness    Vaginal dryness    Family history of diabetes mellitus       Past Medical History:   Diagnosis Date    Gastro-oesophageal reflux disease     Menorrhagia        Past Surgical History:   Procedure Laterality Date    CERVIX REMOVAL  01/2020    HPV related, burst stitches soon afterwards, restitched    COLONOSCOPY N/A 2/5/2024    Procedure: COLONOSCOPY, FLEXIBLE, WITH LESION REMOVAL USING SNARE;  Surgeon: Kalli Castro MD;  Location:  OR     COLONOSCOPY N/A 2024    Procedure: COLONOSCOPY, WITH POLYPECTOMY AND BIOPSY;  Surgeon: Kalli Castro MD;  Location: [facility]    COLONOSCOPY WITH CO2 INSUFFLATION N/A 2024    Procedure: Colonoscopy with CO2 insufflation;  Surgeon: Kalli Castro MD;  Location: [facility]    DILATION AND CURETTAGE      DILATION AND CURETTAGE N/A 2020    Procedure: EXAM UNDER ANESTHESIA  REPAIR OF VAGINAL CUFF;  Surgeon: Haritha Metz MD;  Location:  OR    LAPAROSCOPIC HYSTERECTOMY SUPRACERVICAL  2013    Procedure: LAPAROSCOPIC HYSTERECTOMY SUPRACERVICAL;  LAPAROSCOPIC SUPRACERVICAL HYSTERECTOMY,BILATERAL SALPINGECTOMY;  Surgeon: Olivier Plasencia MD;  Location:  OR       Family History   Problem Relation Age of Onset    Depression Mother     Anxiety Disorder Mother     Factor V Leiden deficiency Mother         had clots, on coumadin    Deep Vein Thrombosis (DVT) Mother     Diabetes Father 75        oral meds?    Hypertension Father     Hyperlipidemia Father     Skin Cancer Father     Skin Cancer Sister     Factor V Leiden deficiency Sister     Coronary Artery Disease Maternal Grandmother         multiple MIs, first in 70s,  at 98    Hyperlipidemia Maternal Grandmother         strict diet after dx/MI    Skin Cancer Maternal Grandmother     Factor V Leiden deficiency Maternal Grandmother     Other Cancer Maternal Grandfather     Diabetes Paternal Grandmother          at 81, declined foot amputation    Skin Cancer Paternal Grandmother        Social History     Tobacco Use    Smoking status: Never    Smokeless tobacco: Never   Substance Use Topics    Alcohol use: Yes     Comment: SOCIALLY         Exam:    Vitals: LMP 2013   BMI: There is no height or weight on file to calculate BMI.  Height: Data Unavailable    Constitutional/ general:  Pt is in no apparent distress, appears well-nourished.  Cooperative with history and physical exam.     Psych:  The patient answered questions appropriately.   Normal affect.  Seems to have reasonable expectations, in terms of treatment.     Eyes:  Visual scanning/ tracking without deficit.     Ears:  Response to auditory stimuli is normal.  negative hearing aid devices.  Auricles in proper alignment.     Lymphatic:  Popliteal lymph nodes not enlarged.     Lungs:  Non labored breathing, non labored speech. No cough.  No audible wheezing. Even, quiet breathing.       Vascular:  positive pedal pulses bilaterally for both the DP and PT arteries.  CFT < 3 sec.  Sl;ight ankle edema.  positive pedal hair growth.    Neuro:  Alert and oriented x 3. Coordinated gait.  Light touch sensation is intact to the L4, L5, S1 distributions. No obvious deficits.  No evidence of neurological-based weakness, spasticity, or contracture in the lower extremities.     Derm: Normal texture and turgor.  No erythema, ecchymosis, or cyanosis.      Musculoskeletal:    Lower extremity muscle strength is normal.  Patient is ambulatory without an assistive device or brace.  No gross deformities.  Normal arch.  No forefoot or rear foot deformities noted.  Normal ROM all fore foot and rearfoot joints.  No equinus.  There is a mass on the dorsal medial left hallux IPJ.   It is well encapsulated.   Positive pain with palpation.  Nonpulsitile.  No pain with ROM or stressing any tendons.  Appears to have clear fluid under skin.         A:  left first toe digital mucoid cyst    P:  Discussed any type of pressure could have caused this return.  The increased swelling could have caused this.  Also discussed if she sits on the dorsum of her feet a lot this could cause return of cyst.  Discussed trying to keep this offloaded in the future.  Discussed surgery versus exsanguination.  She would like to exsanguinate today.  Risks complications, and efficacy of exsanguination discussed.  Patient understands that if we exsanguinate this it could come back immediately.  After written consent we blocked the area with cold  spray and prepped in a normal sterile maner.  20 gauge needle was inserted into cyst and all fluid was removed.  Cyst then flushed with 1/4 cc of Kenalog 10 mg.  We exsanguinated all fluid until it was flat.  We dressed this with a 2 x 2 folded in half as a buttress and 1 inch Coban to give compression.  Will continue to keep pressure on this for the next week to try to keep this down.  Warned patient not to try to remove fluid and squeeze this herself as it could become infected.  If this drains at all return to clinic asap. If returns and painful we discussed surgical removal.  Even with this there is a chance this could return.  She lives in [facility].  If she would like this surgically removed we will refer to one of my colleagues who works in the [facility].      Colten Burris, MAXINE, FACFAS

## 2025-05-14 ENCOUNTER — ONCOLOGY VISIT (OUTPATIENT)
Dept: ONCOLOGY | Facility: HOSPITAL | Age: 55
End: 2025-05-14
Attending: FAMILY MEDICINE
Payer: COMMERCIAL

## 2025-05-14 ENCOUNTER — PRE VISIT (OUTPATIENT)
Dept: ONCOLOGY | Facility: HOSPITAL | Age: 55
End: 2025-05-14
Payer: COMMERCIAL

## 2025-05-14 ENCOUNTER — LAB (OUTPATIENT)
Dept: INFUSION THERAPY | Facility: HOSPITAL | Age: 55
End: 2025-05-14
Attending: INTERNAL MEDICINE
Payer: COMMERCIAL

## 2025-05-14 VITALS
OXYGEN SATURATION: 98 % | HEIGHT: 59 IN | TEMPERATURE: 99 F | RESPIRATION RATE: 20 BRPM | DIASTOLIC BLOOD PRESSURE: 77 MMHG | SYSTOLIC BLOOD PRESSURE: 145 MMHG | HEART RATE: 96 BPM | WEIGHT: 160 LBS | BODY MASS INDEX: 32.25 KG/M2

## 2025-05-14 DIAGNOSIS — D75.839 THROMBOCYTOSIS: ICD-10-CM

## 2025-05-14 LAB
BASOPHILS # BLD AUTO: 0 10E3/UL (ref 0–0.2)
BASOPHILS NFR BLD AUTO: 1 %
EOSINOPHIL # BLD AUTO: 0.1 10E3/UL (ref 0–0.7)
EOSINOPHIL NFR BLD AUTO: 2 %
ERYTHROCYTE [DISTWIDTH] IN BLOOD BY AUTOMATED COUNT: 13.5 % (ref 10–15)
HCT VFR BLD AUTO: 45 % (ref 35–47)
HGB BLD-MCNC: 15.1 G/DL (ref 11.7–15.7)
IMM GRANULOCYTES # BLD: 0 10E3/UL
IMM GRANULOCYTES NFR BLD: 0 %
LYMPHOCYTES # BLD AUTO: 2.6 10E3/UL (ref 0.8–5.3)
LYMPHOCYTES NFR BLD AUTO: 38 %
MCH RBC QN AUTO: 29.5 PG (ref 26.5–33)
MCHC RBC AUTO-ENTMCNC: 33.6 G/DL (ref 31.5–36.5)
MCV RBC AUTO: 88 FL (ref 78–100)
MONOCYTES # BLD AUTO: 0.4 10E3/UL (ref 0–1.3)
MONOCYTES NFR BLD AUTO: 6 %
NEUTROPHILS # BLD AUTO: 3.7 10E3/UL (ref 1.6–8.3)
NEUTROPHILS NFR BLD AUTO: 54 %
NRBC # BLD AUTO: 0 10E3/UL
NRBC BLD AUTO-RTO: 0 /100
PLATELET # BLD AUTO: 565 10E3/UL (ref 150–450)
RBC # BLD AUTO: 5.11 10E6/UL (ref 3.8–5.2)
WBC # BLD AUTO: 6.9 10E3/UL (ref 4–11)

## 2025-05-14 PROCEDURE — 36415 COLL VENOUS BLD VENIPUNCTURE: CPT

## 2025-05-14 PROCEDURE — 81339 MPL GENE SEQ ALYS EXON 10: CPT | Performed by: INTERNAL MEDICINE

## 2025-05-14 PROCEDURE — 82668 ASSAY OF ERYTHROPOIETIN: CPT

## 2025-05-14 PROCEDURE — 81279 JAK2 GENE TRGT SEQUENCE ALYS: CPT | Performed by: INTERNAL MEDICINE

## 2025-05-14 PROCEDURE — G2211 COMPLEX E/M VISIT ADD ON: HCPCS | Performed by: INTERNAL MEDICINE

## 2025-05-14 PROCEDURE — G0463 HOSPITAL OUTPT CLINIC VISIT: HCPCS | Performed by: INTERNAL MEDICINE

## 2025-05-14 PROCEDURE — 99203 OFFICE O/P NEW LOW 30 MIN: CPT | Performed by: INTERNAL MEDICINE

## 2025-05-14 PROCEDURE — 85025 COMPLETE CBC W/AUTO DIFF WBC: CPT

## 2025-05-14 PROCEDURE — G0452 MOLECULAR PATHOLOGY INTERPR: HCPCS | Mod: 26 | Performed by: PATHOLOGY

## 2025-05-14 RX ORDER — IBUPROFEN 200 MG
400-600 TABLET ORAL PRN
COMMUNITY

## 2025-05-14 ASSESSMENT — PAIN SCALES - GENERAL: PAINLEVEL_OUTOF10: NO PAIN (0)

## 2025-05-14 NOTE — PROGRESS NOTES
"Oncology Rooming Note    May 14, 2025 10:53 AM   Radha Bonilla is a 54 year old female who presents for:    Chief Complaint   Patient presents with    Oncology Clinic Visit     New patient consult     Initial Vitals: BP (!) 145/77 (BP Location: Left arm, Patient Position: Sitting, Cuff Size: Adult Regular)   Pulse 96   Temp 99  F (37.2  C) (Tympanic)   Resp 20   Ht 1.499 m (4' 11\")   Wt 72.6 kg (160 lb)   LMP 09/07/2013   SpO2 98%   BMI 32.32 kg/m   Estimated body mass index is 32.32 kg/m  as calculated from the following:    Height as of this encounter: 1.499 m (4' 11\").    Weight as of this encounter: 72.6 kg (160 lb). Body surface area is 1.74 meters squared.  No Pain (0) Comment: Data Unavailable   Patient's last menstrual period was 09/07/2013.  Allergies reviewed: Yes  Medications reviewed: Yes    Medications: Medication refills not needed today.  Pharmacy name entered into [employer]: [id number] - [address] AT [address] [place] [address]    Frailty Screening:   Is the patient here for a new oncology consult visit in cancer care? 2. No    PHQ9:  Did this patient require a PHQ9?: No          MAURA TAMEZ CMA            "
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
## 2025-05-15 LAB — EPO SERPL-ACNC: 6 MU/ML

## 2025-06-19 ENCOUNTER — VIRTUAL VISIT (OUTPATIENT)
Dept: ONCOLOGY | Facility: CLINIC | Age: 55
End: 2025-06-19
Attending: INTERNAL MEDICINE
Payer: COMMERCIAL

## 2025-06-19 VITALS — BODY MASS INDEX: 32.32 KG/M2 | HEIGHT: 59 IN

## 2025-06-19 DIAGNOSIS — D47.1 MPN (MYELOPROLIFERATIVE NEOPLASM) (H): Primary | ICD-10-CM

## 2025-06-19 ASSESSMENT — PAIN SCALES - GENERAL: PAINLEVEL_OUTOF10: NO PAIN (0)

## 2025-06-19 NOTE — LETTER
6/19/2025      Radha Bonilla  [address] [facility] [address]      Dear Colleague,    Thank you for referring your patient, Radha Bonilla, to the [facility]. Please see a copy of my visit note below.    Virtual Visit Details    Type of service:  Video Visit   Video start time: 7:58 AM  Video stop time: 8:13 AM  Originating Location (pt. Location): Home    Distant Location (provider location):  Off-site  Platform used for Video Visit: [facility] Hematology and Oncology Progress Note    Patient: Radha Bonilla  MRN: [id number]  6/19/25        Reason for Visit    Chief Complaint   Patient presents with     RECHECK         Problem List Items Addressed This Visit    None  Visit Diagnoses         MPN (myeloproliferative neoplasm) (H)    -  Primary              Assessment and Plan  Myeloproliferative neoplasm, most likely essential thrombocythemia, JAK2 V6 17 F+  The high platelet count is likely due to essential thrombocythemia (ET), as indicated by the [id number] mutation. The platelet count has fluctuated but remains elevated, which is characteristic of myeloproliferative neoplasms.  Based on her clinical presentation and lab studies this is most likely ET at this point in time.      I reviewed the diagnosis of essential thrombocythemia, its natural course, prognosis, complications and treatment options in detail today. Essential thrombocythemia increases the risk of blood clots, especially given the family history of blood clots and Factor V Leiden. Currently classified as low-risk ET due to age and lack of prior thrombosis.  Start low-dose aspirin to mitigate the risk of blood clots.  Monitor platelet counts regularly.    Her serum erythropoietin level was normal but towards the lower end of the normal at 6.  Previously she had mildly elevated hemoglobin on 1 occasion.  Less likely to be polycythemia vera however I would like to repeat her  erythropoietin levels in the future to make sure.      We also discussed about bone marrow biopsy.  I explained her the rationale behind doing a bone marrow biopsy which is basically to look at fibrosis in the marrow and for final calcification and diagnosis of the myeloproliferative neoplasm per WHO criteria.    Schedule follow-up blood tests in mid to late August 2025 to reassess platelet levels and erythropoietin levels. Consider bone marrow biopsy before initiating any treatment to reduce platelet counts.    Results  - JAK2 mutation: Positive for [id number]  - Platelet count: 565 (fluctuating around 500)  - Hemoglobin level in February: Slightly elevated, but not in polycythemia vera range  - Erythropoietin level: Normal at 6  - I reviewed lab results personally and independently interpreted the results.         Cancer Staging   No matching staging information was found for the patient.      ECOG Performance    0 - Independent         Problem List    Patient Active Problem List   Diagnosis     CARDIOVASCULAR SCREENING; LDL GOAL LESS THAN 160     S/P hysterectomy     Post-operative pain     History of abnormal cervical Pap smear     Skin lesion     Family history of skin cancer     Cherry angioma     Skin cancer screening     Daytime sleepiness     Vaginal dryness     Family history of diabetes mellitus        Hematologic history  This is a 54-year-old female with family history of factor V Leiden mutation who was seen in the hematology clinic on 5/14/2025 for concerns regarding elevated platelet counts.    Persistent elevation in the platelet counts spanned over several months.  No evidence of any secondary causes.    MPN mutation testing ordered on 5/14/2025: Positive for JAK2 V6 17F    Interval History   Radha Bonilla is a 55 year old female with new diagnosis of myeloid neoplasm most likely ET who is seen as a virtual visit to discuss further evaluation and management of the same.    Previously I saw her  for the elevated platelet count.  I recommended MPN mutation testing.  She had no prior history of any blood clots although has a significant family history of blood clots and factor V Leiden mutation.        Review of Systems  A comprehensive review of systems was negative except for what is noted in the interval history    Current Outpatient Medications   Medication Sig Dispense Refill     Digestive Enzymes (DIGESTIVE ENZYME PO) Take 1 capsule by mouth as needed.       estradiol (VAGIFEM) 10 MCG TABS vaginal tablet Place 1 tablet (10 mcg) vaginally twice a week. (Patient not taking: Reported on 5/14/2025) 24 tablet 3     ibuprofen (ADVIL/MOTRIN) 200 MG tablet Take 400-600 mg by mouth as needed for pain.       magnesium 250 MG tablet Take 1 tablet by mouth daily. (Patient not taking: Reported on 5/14/2025)       Multiple Vitamin (MULTIVITAMIN PO) Take 1 tablet by mouth daily.       Naproxen Sodium (ALEVE PO) Take 1-2 tablets by mouth as needed for moderate pain.       Vitamin D3 (CHOLECALCIFEROL) 25 mcg (1000 units) tablet Take 25 mcg by mouth daily (Patient not taking: Reported on 5/14/2025)       No current facility-administered medications for this visit.        Physical Exam    Failed to redirect to the Timeline version of the REVFS SmartLink.    General: alert and cooperative      Lab Results    No results found for this or any previous visit (from the past week).    Imaging    No results found.    The longitudinal plan of care for the diagnosis(es)/condition(s) as documented were addressed during this visit. Due to the added complexity in care, I will continue to support Radha in the subsequent management and with ongoing continuity of care.    A total of 30 min was spent today on this visit which included face to face conversation with the patient, EMR review, counseling and co-ordination of care and medical documentation.    Signed by: Caleb Colon MD      Again, thank you for allowing me to  participate in the care of your patient.        Sincerely,        Caleb Colon MD    Electronically signed

## 2025-06-19 NOTE — PROGRESS NOTES
Virtual Visit Details    Type of service:  Video Visit   Video start time: 7:58 AM  Video stop time: 8:13 AM  Originating Location (pt. Location): Home    Distant Location (provider location):  Off-site  Platform used for Video Visit: [facility] Hematology and Oncology Progress Note    Patient: Radha Bonilla  MRN: [id number]  6/19/25        Reason for Visit    Chief Complaint   Patient presents with    RECHECK         Problem List Items Addressed This Visit    None  Visit Diagnoses         MPN (myeloproliferative neoplasm) (H)    -  Primary              Assessment and Plan  Myeloproliferative neoplasm, most likely essential thrombocythemia, JAK2 V6 17 F+  The high platelet count is likely due to essential thrombocythemia (ET), as indicated by the [id number] mutation. The platelet count has fluctuated but remains elevated, which is characteristic of myeloproliferative neoplasms.  Based on her clinical presentation and lab studies this is most likely ET at this point in time.      I reviewed the diagnosis of essential thrombocythemia, its natural course, prognosis, complications and treatment options in detail today. Essential thrombocythemia increases the risk of blood clots, especially given the family history of blood clots and Factor V Leiden. Currently classified as low-risk ET due to age and lack of prior thrombosis.  Start low-dose aspirin to mitigate the risk of blood clots.  Monitor platelet counts regularly.    Her serum erythropoietin level was normal but towards the lower end of the normal at 6.  Previously she had mildly elevated hemoglobin on 1 occasion.  Less likely to be polycythemia vera however I would like to repeat her erythropoietin levels in the future to make sure.      We also discussed about bone marrow biopsy.  I explained her the rationale behind doing a bone marrow biopsy which is basically to look at fibrosis in the marrow and for final calcification and diagnosis of  the myeloproliferative neoplasm per WHO criteria.    Schedule follow-up blood tests in mid to late August 2025 to reassess platelet levels and erythropoietin levels. Consider bone marrow biopsy before initiating any treatment to reduce platelet counts.    Results  - JAK2 mutation: Positive for [id number]  - Platelet count: 565 (fluctuating around 500)  - Hemoglobin level in February: Slightly elevated, but not in polycythemia vera range  - Erythropoietin level: Normal at 6  - I reviewed lab results personally and independently interpreted the results.         Cancer Staging   No matching staging information was found for the patient.      ECOG Performance    0 - Independent         Problem List    Patient Active Problem List   Diagnosis    CARDIOVASCULAR SCREENING; LDL GOAL LESS THAN 160    S/P hysterectomy    Post-operative pain    History of abnormal cervical Pap smear    Skin lesion    Family history of skin cancer    Cherry angioma    Skin cancer screening    Daytime sleepiness    Vaginal dryness    Family history of diabetes mellitus        Hematologic history  This is a 54-year-old female with family history of factor V Leiden mutation who was seen in the hematology clinic on 5/14/2025 for concerns regarding elevated platelet counts.    Persistent elevation in the platelet counts spanned over several months.  No evidence of any secondary causes.    MPN mutation testing ordered on 5/14/2025: Positive for JAK2 V6 17F    Interval History   Radha Bonilla is a 55 year old female with new diagnosis of myeloid neoplasm most likely ET who is seen as a virtual visit to discuss further evaluation and management of the same.    Previously I saw her for the elevated platelet count.  I recommended MPN mutation testing.  She had no prior history of any blood clots although has a significant family history of blood clots and factor V Leiden mutation.        Review of Systems  A comprehensive review of systems was  negative except for what is noted in the interval history    Current Outpatient Medications   Medication Sig Dispense Refill    Digestive Enzymes (DIGESTIVE ENZYME PO) Take 1 capsule by mouth as needed.      estradiol (VAGIFEM) 10 MCG TABS vaginal tablet Place 1 tablet (10 mcg) vaginally twice a week. (Patient not taking: Reported on 5/14/2025) 24 tablet 3    ibuprofen (ADVIL/MOTRIN) 200 MG tablet Take 400-600 mg by mouth as needed for pain.      magnesium 250 MG tablet Take 1 tablet by mouth daily. (Patient not taking: Reported on 5/14/2025)      Multiple Vitamin (MULTIVITAMIN PO) Take 1 tablet by mouth daily.      Naproxen Sodium (ALEVE PO) Take 1-2 tablets by mouth as needed for moderate pain.      Vitamin D3 (CHOLECALCIFEROL) 25 mcg (1000 units) tablet Take 25 mcg by mouth daily (Patient not taking: Reported on 5/14/2025)       No current facility-administered medications for this visit.        Physical Exam    Failed to redirect to the Timeline version of the REV SmartLink.    General: alert and cooperative      Lab Results    No results found for this or any previous visit (from the past week).    Imaging    No results found.    The longitudinal plan of care for the diagnosis(es)/condition(s) as documented were addressed during this visit. Due to the added complexity in care, I will continue to support Radha in the subsequent management and with ongoing continuity of care.    A total of 30 min was spent today on this visit which included face to face conversation with the patient, EMR review, counseling and co-ordination of care and medical documentation.    Signed by: Caleb Colon MD

## 2025-06-19 NOTE — NURSING NOTE
Patient reviewed medications and allergies in Mychart during e-check in and said everything looked correct.        Current patient location: [address] [place] [id number]    Is the patient currently in the state of [place]? YES    Visit mode: VIDEO    If the visit is dropped, the patient can be reconnected by:VIDEO VISIT: Text to cell phone:   Telephone Information:   Mobile [phone]       Will anyone else be joining the visit? NO  (If patient encounters technical issues they should call [phone]    Are changes needed to the allergy or medication list? No    Are refills needed on medications prescribed by this physician? NO    Rooming Documentation:  Questionnaire(s) completed    Reason for visit: RAJ ZAVALETAF

## 2025-06-19 NOTE — LETTER
6/19/2025      Radha Bonilla  [address] [facility] [address]      Dear Colleague,    Thank you for referring your patient, Radha Bonilla, to the [facility]. Please see a copy of my visit note below.    Virtual Visit Details    Type of service:  Video Visit   Video start time: 7:58 AM  Video stop time: 8:13 AM  Originating Location (pt. Location): Home    Distant Location (provider location):  Off-site  Platform used for Video Visit: [facility] Hematology and Oncology Progress Note    Patient: Radha Bonilla  MRN: [id number]  6/19/25        Reason for Visit    Chief Complaint   Patient presents with     RECHECK         Problem List Items Addressed This Visit    None  Visit Diagnoses         MPN (myeloproliferative neoplasm) (H)    -  Primary              Assessment and Plan  Myeloproliferative neoplasm, most likely essential thrombocythemia, JAK2 V6 17 F+  The high platelet count is likely due to essential thrombocythemia (ET), as indicated by the [id number] mutation. The platelet count has fluctuated but remains elevated, which is characteristic of myeloproliferative neoplasms.  Based on her clinical presentation and lab studies this is most likely ET at this point in time.      I reviewed the diagnosis of essential thrombocythemia, its natural course, prognosis, complications and treatment options in detail today. Essential thrombocythemia increases the risk of blood clots, especially given the family history of blood clots and Factor V Leiden. Currently classified as low-risk ET due to age and lack of prior thrombosis.  Start low-dose aspirin to mitigate the risk of blood clots.  Monitor platelet counts regularly.    Her serum erythropoietin level was normal but towards the lower end of the normal at 6.  Previously she had mildly elevated hemoglobin on 1 occasion.  Less likely to be polycythemia vera however I would like to repeat her  erythropoietin levels in the future to make sure.      We also discussed about bone marrow biopsy.  I explained her the rationale behind doing a bone marrow biopsy which is basically to look at fibrosis in the marrow and for final calcification and diagnosis of the myeloproliferative neoplasm per WHO criteria.    Schedule follow-up blood tests in mid to late August 2025 to reassess platelet levels and erythropoietin levels. Consider bone marrow biopsy before initiating any treatment to reduce platelet counts.    Results  - JAK2 mutation: Positive for [id number]  - Platelet count: 565 (fluctuating around 500)  - Hemoglobin level in February: Slightly elevated, but not in polycythemia vera range  - Erythropoietin level: Normal at 6  - I reviewed lab results personally and independently interpreted the results.         Cancer Staging   No matching staging information was found for the patient.      ECOG Performance    0 - Independent         Problem List    Patient Active Problem List   Diagnosis     CARDIOVASCULAR SCREENING; LDL GOAL LESS THAN 160     S/P hysterectomy     Post-operative pain     History of abnormal cervical Pap smear     Skin lesion     Family history of skin cancer     Cherry angioma     Skin cancer screening     Daytime sleepiness     Vaginal dryness     Family history of diabetes mellitus        Hematologic history  This is a 54-year-old female with family history of factor V Leiden mutation who was seen in the hematology clinic on 5/14/2025 for concerns regarding elevated platelet counts.    Persistent elevation in the platelet counts spanned over several months.  No evidence of any secondary causes.    MPN mutation testing ordered on 5/14/2025: Positive for JAK2 V6 17F    Interval History   Radha Bonilla is a 55 year old female with new diagnosis of myeloid neoplasm most likely ET who is seen as a virtual visit to discuss further evaluation and management of the same.    Previously I saw her  for the elevated platelet count.  I recommended MPN mutation testing.  She had no prior history of any blood clots although has a significant family history of blood clots and factor V Leiden mutation.        Review of Systems  A comprehensive review of systems was negative except for what is noted in the interval history    Current Outpatient Medications   Medication Sig Dispense Refill     Digestive Enzymes (DIGESTIVE ENZYME PO) Take 1 capsule by mouth as needed.       estradiol (VAGIFEM) 10 MCG TABS vaginal tablet Place 1 tablet (10 mcg) vaginally twice a week. (Patient not taking: Reported on 5/14/2025) 24 tablet 3     ibuprofen (ADVIL/MOTRIN) 200 MG tablet Take 400-600 mg by mouth as needed for pain.       magnesium 250 MG tablet Take 1 tablet by mouth daily. (Patient not taking: Reported on 5/14/2025)       Multiple Vitamin (MULTIVITAMIN PO) Take 1 tablet by mouth daily.       Naproxen Sodium (ALEVE PO) Take 1-2 tablets by mouth as needed for moderate pain.       Vitamin D3 (CHOLECALCIFEROL) 25 mcg (1000 units) tablet Take 25 mcg by mouth daily (Patient not taking: Reported on 5/14/2025)       No current facility-administered medications for this visit.        Physical Exam    Failed to redirect to the Timeline version of the REVFS SmartLink.    General: alert and cooperative      Lab Results    No results found for this or any previous visit (from the past week).    Imaging    No results found.    The longitudinal plan of care for the diagnosis(es)/condition(s) as documented were addressed during this visit. Due to the added complexity in care, I will continue to support Radha in the subsequent management and with ongoing continuity of care.    A total of 30 min was spent today on this visit which included face to face conversation with the patient, EMR review, counseling and co-ordination of care and medical documentation.    Signed by: Caleb Colon MD      Again, thank you for allowing me to  participate in the care of your patient.        Sincerely,        Caleb Colon MD    Electronically signed

## 2025-06-26 ENCOUNTER — PATIENT OUTREACH (OUTPATIENT)
Dept: ONCOLOGY | Facility: HOSPITAL | Age: 55
End: 2025-06-26
Payer: COMMERCIAL

## 2025-06-26 NOTE — PROGRESS NOTES
Situation: Patient chart reviewed by care coordinator.    Background:   Myeloproliferative neoplasm, most likely essential thrombocythemia, JAK2 V6 17 F+     Assessment:   Essential thrombocythemia increases the risk of blood clots, especially given the family history of blood clots and Factor V Leiden. Currently classified as low-risk ET due to age and lack of prior thrombosis.     Plan/Recommendations:   Start low-dose aspirin to mitigate the risk of blood clots.  Monitor platelet counts regularly  Schedule follow-up blood tests in mid to late August 2025 to reassess platelet levels and erythropoietin levels. Consider bone marrow biopsy before initiating any treatment to reduce platelet counts.     Sent introductory MyC message to pt today.    Mery Huston RN

## 2025-08-25 ENCOUNTER — LAB (OUTPATIENT)
Dept: LAB | Facility: CLINIC | Age: 55
End: 2025-08-25
Payer: COMMERCIAL

## 2025-08-25 DIAGNOSIS — D47.1 MPN (MYELOPROLIFERATIVE NEOPLASM) (H): ICD-10-CM

## 2025-08-25 LAB
BASOPHILS # BLD AUTO: 0.09 10E3/UL (ref 0–0.2)
BASOPHILS NFR BLD AUTO: 1.2 %
EOSINOPHIL # BLD AUTO: 0.21 10E3/UL (ref 0–0.7)
EOSINOPHIL NFR BLD AUTO: 2.9 %
ERYTHROCYTE [DISTWIDTH] IN BLOOD BY AUTOMATED COUNT: 14 % (ref 10–15)
HCT VFR BLD AUTO: 45.5 % (ref 35–47)
HGB BLD-MCNC: 15.2 G/DL (ref 11.7–15.7)
IMM GRANULOCYTES # BLD: <0.03 10E3/UL
IMM GRANULOCYTES NFR BLD: 0.1 %
LYMPHOCYTES # BLD AUTO: 2.96 10E3/UL (ref 0.8–5.3)
LYMPHOCYTES NFR BLD AUTO: 40.8 %
MCH RBC QN AUTO: 29.6 PG (ref 26.5–33)
MCHC RBC AUTO-ENTMCNC: 33.4 G/DL (ref 31.5–36.5)
MCV RBC AUTO: 88.7 FL (ref 78–100)
MONOCYTES # BLD AUTO: 0.49 10E3/UL (ref 0–1.3)
MONOCYTES NFR BLD AUTO: 6.7 %
NEUTROPHILS # BLD AUTO: 3.5 10E3/UL (ref 1.6–8.3)
NEUTROPHILS NFR BLD AUTO: 48.3 %
NRBC # BLD AUTO: <0.03 10E3/UL
NRBC BLD AUTO-RTO: 0 /100
PLATELET # BLD AUTO: 639 10E3/UL (ref 150–450)
RBC # BLD AUTO: 5.13 10E6/UL (ref 3.8–5.2)
WBC # BLD AUTO: 7.26 10E3/UL (ref 4–11)

## 2025-08-25 PROCEDURE — 99000 SPECIMEN HANDLING OFFICE-LAB: CPT

## 2025-08-25 PROCEDURE — 85025 COMPLETE CBC W/AUTO DIFF WBC: CPT

## 2025-08-25 PROCEDURE — 82668 ASSAY OF ERYTHROPOIETIN: CPT | Mod: 90

## 2025-08-25 PROCEDURE — 36415 COLL VENOUS BLD VENIPUNCTURE: CPT

## 2025-08-26 LAB — EPO SERPL-ACNC: 7 MU/ML

## (undated) DEVICE — ESU PENCIL W/HOLSTER E2350H

## (undated) DEVICE — NDL COUNTER 20CT 31142493

## (undated) DEVICE — GLOVE PROTEXIS MICRO 6.0  2D73PM60

## (undated) DEVICE — SU VICRYL 0 CT-1 CR 8X18" J740D

## (undated) DEVICE — LINEN TOWEL PACK X5 5464

## (undated) DEVICE — SOL WATER IRRIG 1000ML BOTTLE 2F7114

## (undated) DEVICE — PACK TVT HYSTEROSCOPY SMA15HYFSE

## (undated) DEVICE — CATH TRAY FOLEY SURESTEP 16FR WDRAIN BAG STLK LATEX A300316A

## (undated) RX ORDER — FENTANYL CITRATE 50 UG/ML
INJECTION, SOLUTION INTRAMUSCULAR; INTRAVENOUS
Status: DISPENSED
Start: 2020-01-24

## (undated) RX ORDER — FENTANYL CITRATE 50 UG/ML
INJECTION, SOLUTION INTRAMUSCULAR; INTRAVENOUS
Status: DISPENSED
Start: 2024-02-05

## (undated) RX ORDER — PROPOFOL 10 MG/ML
INJECTION, EMULSION INTRAVENOUS
Status: DISPENSED
Start: 2020-01-24

## (undated) RX ORDER — GLYCOPYRROLATE 0.2 MG/ML
INJECTION, SOLUTION INTRAMUSCULAR; INTRAVENOUS
Status: DISPENSED
Start: 2020-01-24

## (undated) RX ORDER — DIPHENHYDRAMINE HYDROCHLORIDE 50 MG/ML
INJECTION INTRAMUSCULAR; INTRAVENOUS
Status: DISPENSED
Start: 2020-01-24

## (undated) RX ORDER — LIDOCAINE HYDROCHLORIDE 20 MG/ML
INJECTION, SOLUTION EPIDURAL; INFILTRATION; INTRACAUDAL; PERINEURAL
Status: DISPENSED
Start: 2020-01-24

## (undated) RX ORDER — ONDANSETRON 2 MG/ML
INJECTION INTRAMUSCULAR; INTRAVENOUS
Status: DISPENSED
Start: 2024-02-05

## (undated) RX ORDER — HYDROMORPHONE HYDROCHLORIDE 1 MG/ML
INJECTION, SOLUTION INTRAMUSCULAR; INTRAVENOUS; SUBCUTANEOUS
Status: DISPENSED
Start: 2020-01-24

## (undated) RX ORDER — CEFAZOLIN SODIUM 2 G/100ML
INJECTION, SOLUTION INTRAVENOUS
Status: DISPENSED
Start: 2020-01-24

## (undated) RX ORDER — NEOSTIGMINE METHYLSULFATE 1 MG/ML
VIAL (ML) INJECTION
Status: DISPENSED
Start: 2020-01-24

## (undated) RX ORDER — DEXAMETHASONE SODIUM PHOSPHATE 4 MG/ML
INJECTION, SOLUTION INTRA-ARTICULAR; INTRALESIONAL; INTRAMUSCULAR; INTRAVENOUS; SOFT TISSUE
Status: DISPENSED
Start: 2020-01-24

## (undated) RX ORDER — ONDANSETRON 2 MG/ML
INJECTION INTRAMUSCULAR; INTRAVENOUS
Status: DISPENSED
Start: 2020-01-24